# Patient Record
Sex: MALE
[De-identification: names, ages, dates, MRNs, and addresses within clinical notes are randomized per-mention and may not be internally consistent; named-entity substitution may affect disease eponyms.]

---

## 2017-11-16 ENCOUNTER — LABORATORY RESULT (OUTPATIENT)
Age: 52
End: 2017-11-16

## 2017-11-17 ENCOUNTER — TRANSCRIPTION ENCOUNTER (OUTPATIENT)
Age: 52
End: 2017-11-17

## 2017-11-17 ENCOUNTER — APPOINTMENT (OUTPATIENT)
Dept: INTERNAL MEDICINE | Facility: CLINIC | Age: 52
End: 2017-11-17
Payer: COMMERCIAL

## 2017-11-17 VITALS
WEIGHT: 203 LBS | BODY MASS INDEX: 28.42 KG/M2 | HEIGHT: 71 IN | TEMPERATURE: 98 F | SYSTOLIC BLOOD PRESSURE: 146 MMHG | HEART RATE: 89 BPM | OXYGEN SATURATION: 99 % | DIASTOLIC BLOOD PRESSURE: 88 MMHG

## 2017-11-17 DIAGNOSIS — Z81.8 FAMILY HISTORY OF OTHER MENTAL AND BEHAVIORAL DISORDERS: ICD-10-CM

## 2017-11-17 DIAGNOSIS — Z82.49 FAMILY HISTORY OF ISCHEMIC HEART DISEASE AND OTHER DISEASES OF THE CIRCULATORY SYSTEM: ICD-10-CM

## 2017-11-17 PROCEDURE — 99386 PREV VISIT NEW AGE 40-64: CPT | Mod: 25

## 2017-11-17 PROCEDURE — 93000 ELECTROCARDIOGRAM COMPLETE: CPT

## 2017-11-17 PROCEDURE — 90471 IMMUNIZATION ADMIN: CPT

## 2017-11-17 PROCEDURE — 90686 IIV4 VACC NO PRSV 0.5 ML IM: CPT

## 2017-11-17 PROCEDURE — 90715 TDAP VACCINE 7 YRS/> IM: CPT

## 2017-11-17 PROCEDURE — 36415 COLL VENOUS BLD VENIPUNCTURE: CPT

## 2017-11-17 PROCEDURE — G0008: CPT | Mod: LT

## 2017-11-17 PROCEDURE — 90472 IMMUNIZATION ADMIN EACH ADD: CPT

## 2017-11-20 ENCOUNTER — MESSAGE (OUTPATIENT)
Age: 52
End: 2017-11-20

## 2017-11-20 LAB
ALBUMIN SERPL ELPH-MCNC: 4.2 G/DL
ALP BLD-CCNC: 66 U/L
ALT SERPL-CCNC: 33 U/L
ANION GAP SERPL CALC-SCNC: 17 MMOL/L
APPEARANCE: CLEAR
AST SERPL-CCNC: 31 U/L
BASOPHILS # BLD AUTO: 0.03 K/UL
BASOPHILS NFR BLD AUTO: 0.3 %
BILIRUB SERPL-MCNC: 0.4 MG/DL
BILIRUBIN URINE: NEGATIVE
BLOOD URINE: NEGATIVE
BUN SERPL-MCNC: 24 MG/DL
CALCIUM SERPL-MCNC: 9.8 MG/DL
CHLORIDE SERPL-SCNC: 103 MMOL/L
CHOLEST SERPL-MCNC: 199 MG/DL
CHOLEST/HDLC SERPL: 4.1 RATIO
CO2 SERPL-SCNC: 21 MMOL/L
COLOR: YELLOW
CREAT SERPL-MCNC: 1.09 MG/DL
EOSINOPHIL # BLD AUTO: 0.09 K/UL
EOSINOPHIL NFR BLD AUTO: 0.9 %
GLUCOSE QUALITATIVE U: NEGATIVE MG/DL
GLUCOSE SERPL-MCNC: 73 MG/DL
HAV IGG+IGM SER QL: NONREACTIVE
HBA1C MFR BLD HPLC: 5.7 %
HBV SURFACE AB SER QL: NONREACTIVE
HBV SURFACE AG SER QL: NONREACTIVE
HCT VFR BLD CALC: 37.5 %
HCV AB SER QL: NONREACTIVE
HCV S/CO RATIO: 0.2 S/CO
HDLC SERPL-MCNC: 49 MG/DL
HGB BLD-MCNC: 11.5 G/DL
HIV1+2 AB SPEC QL IA.RAPID: NONREACTIVE
IMM GRANULOCYTES NFR BLD AUTO: 0.2 %
KETONES URINE: NEGATIVE
LDLC SERPL CALC-MCNC: 128 MG/DL
LEUKOCYTE ESTERASE URINE: NEGATIVE
LYMPHOCYTES # BLD AUTO: 1.43 K/UL
LYMPHOCYTES NFR BLD AUTO: 13.6 %
MAN DIFF?: NORMAL
MCHC RBC-ENTMCNC: 24.5 PG
MCHC RBC-ENTMCNC: 30.7 GM/DL
MCV RBC AUTO: 80 FL
MONOCYTES # BLD AUTO: 0.81 K/UL
MONOCYTES NFR BLD AUTO: 7.7 %
NEUTROPHILS # BLD AUTO: 8.15 K/UL
NEUTROPHILS NFR BLD AUTO: 77.3 %
NITRITE URINE: NEGATIVE
PH URINE: 7.5
PLATELET # BLD AUTO: 321 K/UL
POTASSIUM SERPL-SCNC: 5.1 MMOL/L
PROT SERPL-MCNC: 7.7 G/DL
PROTEIN URINE: NEGATIVE MG/DL
PSA SERPL-MCNC: 0.52 NG/ML
RBC # BLD: 4.69 M/UL
RBC # FLD: 19.7 %
SODIUM SERPL-SCNC: 141 MMOL/L
SPECIFIC GRAVITY URINE: 1.01
TRIGL SERPL-MCNC: 111 MG/DL
UROBILINOGEN URINE: NEGATIVE MG/DL
WBC # FLD AUTO: 10.53 K/UL

## 2020-01-10 ENCOUNTER — TRANSCRIPTION ENCOUNTER (OUTPATIENT)
Age: 55
End: 2020-01-10

## 2020-01-17 ENCOUNTER — APPOINTMENT (OUTPATIENT)
Dept: INTERNAL MEDICINE | Facility: CLINIC | Age: 55
End: 2020-01-17

## 2020-02-06 ENCOUNTER — NON-APPOINTMENT (OUTPATIENT)
Age: 55
End: 2020-02-06

## 2020-02-06 ENCOUNTER — LABORATORY RESULT (OUTPATIENT)
Age: 55
End: 2020-02-06

## 2020-02-06 ENCOUNTER — APPOINTMENT (OUTPATIENT)
Dept: INTERNAL MEDICINE | Facility: CLINIC | Age: 55
End: 2020-02-06
Payer: COMMERCIAL

## 2020-02-06 VITALS
DIASTOLIC BLOOD PRESSURE: 90 MMHG | HEART RATE: 83 BPM | SYSTOLIC BLOOD PRESSURE: 166 MMHG | BODY MASS INDEX: 29.57 KG/M2 | TEMPERATURE: 98 F | WEIGHT: 212 LBS | OXYGEN SATURATION: 100 %

## 2020-02-06 DIAGNOSIS — I10 ESSENTIAL (PRIMARY) HYPERTENSION: ICD-10-CM

## 2020-02-06 DIAGNOSIS — R07.89 OTHER CHEST PAIN: ICD-10-CM

## 2020-02-06 PROCEDURE — 99396 PREV VISIT EST AGE 40-64: CPT | Mod: 25

## 2020-02-06 PROCEDURE — 93000 ELECTROCARDIOGRAM COMPLETE: CPT

## 2020-02-06 PROCEDURE — 36415 COLL VENOUS BLD VENIPUNCTURE: CPT

## 2020-02-06 PROCEDURE — 99212 OFFICE O/P EST SF 10 MIN: CPT | Mod: 25

## 2020-02-07 LAB
C TRACH RRNA SPEC QL NAA+PROBE: NOT DETECTED
HIV1+2 AB SPEC QL IA.RAPID: NONREACTIVE
N GONORRHOEA RRNA SPEC QL NAA+PROBE: NOT DETECTED
SOURCE AMPLIFICATION: NORMAL
T PALLIDUM AB SER QL IA: NEGATIVE

## 2020-02-08 LAB
HSV 1+2 IGG SER IA-IMP: NEGATIVE
HSV 1+2 IGG SER IA-IMP: POSITIVE
HSV1 IGG SER QL: 1.17 INDEX
HSV2 IGG SER QL: 0.44 INDEX

## 2020-10-06 ENCOUNTER — APPOINTMENT (OUTPATIENT)
Dept: INTERNAL MEDICINE | Facility: CLINIC | Age: 55
End: 2020-10-06
Payer: COMMERCIAL

## 2020-10-06 VITALS
HEART RATE: 92 BPM | TEMPERATURE: 98 F | WEIGHT: 210 LBS | OXYGEN SATURATION: 99 % | DIASTOLIC BLOOD PRESSURE: 90 MMHG | BODY MASS INDEX: 29.29 KG/M2 | SYSTOLIC BLOOD PRESSURE: 162 MMHG

## 2020-10-06 DIAGNOSIS — Z23 ENCOUNTER FOR IMMUNIZATION: ICD-10-CM

## 2020-10-06 PROCEDURE — 99213 OFFICE O/P EST LOW 20 MIN: CPT

## 2020-10-06 PROCEDURE — 90686 IIV4 VACC NO PRSV 0.5 ML IM: CPT

## 2020-10-06 PROCEDURE — G0008: CPT

## 2020-10-23 ENCOUNTER — APPOINTMENT (OUTPATIENT)
Dept: INTERNAL MEDICINE | Facility: CLINIC | Age: 55
End: 2020-10-23
Payer: COMMERCIAL

## 2020-10-23 PROCEDURE — 99213 OFFICE O/P EST LOW 20 MIN: CPT | Mod: 25

## 2020-10-23 PROCEDURE — 99072 ADDL SUPL MATRL&STAF TM PHE: CPT

## 2020-11-13 ENCOUNTER — APPOINTMENT (OUTPATIENT)
Dept: INTERNAL MEDICINE | Facility: CLINIC | Age: 55
End: 2020-11-13
Payer: COMMERCIAL

## 2020-11-13 PROCEDURE — 99213 OFFICE O/P EST LOW 20 MIN: CPT | Mod: 95

## 2020-11-22 ENCOUNTER — RX RENEWAL (OUTPATIENT)
Age: 55
End: 2020-11-22

## 2021-02-14 ENCOUNTER — TRANSCRIPTION ENCOUNTER (OUTPATIENT)
Age: 56
End: 2021-02-14

## 2021-02-22 ENCOUNTER — NON-APPOINTMENT (OUTPATIENT)
Age: 56
End: 2021-02-22

## 2021-10-07 ENCOUNTER — RX RENEWAL (OUTPATIENT)
Age: 56
End: 2021-10-07

## 2021-12-17 ENCOUNTER — APPOINTMENT (OUTPATIENT)
Dept: INTERNAL MEDICINE | Facility: CLINIC | Age: 56
End: 2021-12-17

## 2022-01-18 ENCOUNTER — LABORATORY RESULT (OUTPATIENT)
Age: 57
End: 2022-01-18

## 2022-01-18 ENCOUNTER — APPOINTMENT (OUTPATIENT)
Dept: INTERNAL MEDICINE | Facility: CLINIC | Age: 57
End: 2022-01-18
Payer: COMMERCIAL

## 2022-01-18 VITALS
HEIGHT: 71 IN | HEART RATE: 72 BPM | DIASTOLIC BLOOD PRESSURE: 86 MMHG | SYSTOLIC BLOOD PRESSURE: 146 MMHG | BODY MASS INDEX: 29.26 KG/M2 | TEMPERATURE: 97.1 F | WEIGHT: 209 LBS | OXYGEN SATURATION: 98 %

## 2022-01-18 DIAGNOSIS — U07.1 COVID-19: ICD-10-CM

## 2022-01-18 PROCEDURE — 99213 OFFICE O/P EST LOW 20 MIN: CPT | Mod: 25

## 2022-01-18 PROCEDURE — 36415 COLL VENOUS BLD VENIPUNCTURE: CPT

## 2022-01-18 PROCEDURE — 99396 PREV VISIT EST AGE 40-64: CPT | Mod: 25

## 2022-01-19 LAB
ALBUMIN SERPL ELPH-MCNC: 4.4 G/DL
ALP BLD-CCNC: 75 U/L
ALT SERPL-CCNC: 37 U/L
ANION GAP SERPL CALC-SCNC: 14 MMOL/L
APPEARANCE: CLEAR
AST SERPL-CCNC: 30 U/L
BASOPHILS # BLD AUTO: 0.04 K/UL
BASOPHILS NFR BLD AUTO: 0.6 %
BILIRUB SERPL-MCNC: 0.3 MG/DL
BILIRUBIN URINE: NEGATIVE
BLOOD URINE: NEGATIVE
BUN SERPL-MCNC: 13 MG/DL
CALCIUM SERPL-MCNC: 8.9 MG/DL
CHLORIDE SERPL-SCNC: 103 MMOL/L
CHOLEST SERPL-MCNC: 194 MG/DL
CO2 SERPL-SCNC: 23 MMOL/L
COLOR: COLORLESS
CREAT SERPL-MCNC: 0.98 MG/DL
EOSINOPHIL # BLD AUTO: 0.13 K/UL
EOSINOPHIL NFR BLD AUTO: 2 %
ESTIMATED AVERAGE GLUCOSE: 120 MG/DL
GLUCOSE QUALITATIVE U: NEGATIVE
GLUCOSE SERPL-MCNC: 91 MG/DL
HBA1C MFR BLD HPLC: 5.8 %
HCT VFR BLD CALC: 38.8 %
HDLC SERPL-MCNC: 38 MG/DL
HGB BLD-MCNC: 11.3 G/DL
IMM GRANULOCYTES NFR BLD AUTO: 0.2 %
KETONES URINE: NEGATIVE
LDLC SERPL CALC-MCNC: 124 MG/DL
LEUKOCYTE ESTERASE URINE: NEGATIVE
LYMPHOCYTES # BLD AUTO: 2.42 K/UL
LYMPHOCYTES NFR BLD AUTO: 36.4 %
MAN DIFF?: NORMAL
MCHC RBC-ENTMCNC: 23.6 PG
MCHC RBC-ENTMCNC: 29.1 GM/DL
MCV RBC AUTO: 81.2 FL
MONOCYTES # BLD AUTO: 0.75 K/UL
MONOCYTES NFR BLD AUTO: 11.3 %
NEUTROPHILS # BLD AUTO: 3.3 K/UL
NEUTROPHILS NFR BLD AUTO: 49.5 %
NITRITE URINE: NEGATIVE
NONHDLC SERPL-MCNC: 156 MG/DL
PH URINE: 7
PLATELET # BLD AUTO: 312 K/UL
POTASSIUM SERPL-SCNC: 4 MMOL/L
PROT SERPL-MCNC: 7 G/DL
PROTEIN URINE: NEGATIVE
RBC # BLD: 4.78 M/UL
RBC # FLD: 20.2 %
SODIUM SERPL-SCNC: 141 MMOL/L
SPECIFIC GRAVITY URINE: 1
TRIGL SERPL-MCNC: 159 MG/DL
UROBILINOGEN URINE: NORMAL
WBC # FLD AUTO: 6.65 K/UL

## 2022-01-20 LAB
HIV1+2 AB SPEC QL IA.RAPID: NONREACTIVE
T PALLIDUM AB SER QL IA: NEGATIVE

## 2022-01-24 LAB
C TRACH RRNA SPEC QL NAA+PROBE: NOT DETECTED
N GONORRHOEA RRNA SPEC QL NAA+PROBE: NOT DETECTED
SOURCE AMPLIFICATION: NORMAL

## 2022-02-14 ENCOUNTER — RX RENEWAL (OUTPATIENT)
Age: 57
End: 2022-02-14

## 2022-04-18 ENCOUNTER — APPOINTMENT (OUTPATIENT)
Age: 57
End: 2022-04-18
Payer: COMMERCIAL

## 2022-04-18 DIAGNOSIS — K64.9 UNSPECIFIED HEMORRHOIDS: ICD-10-CM

## 2022-04-18 DIAGNOSIS — K64.8 OTHER HEMORRHOIDS: ICD-10-CM

## 2022-04-18 PROCEDURE — 99204 OFFICE O/P NEW MOD 45 MIN: CPT | Mod: 95

## 2022-04-18 RX ORDER — ZINC OXIDE AND COCOA BUTTER 270; 2052 MG/1; MG/1
76-10 SUPPOSITORY RECTAL
Qty: 90 | Refills: 0 | Status: ACTIVE | COMMUNITY
Start: 2022-04-18 | End: 1900-01-01

## 2022-04-20 NOTE — REASON FOR VISIT
[Home] : at home, [unfilled] , at the time of the visit. [Medical Office: ([facility], [place])___] : at the medical office located in  [Verbal consent obtained from patient] : the patient, [unfilled] [Initial Evaluation] : an initial evaluation [FreeTextEntry1] : CRC

## 2022-04-20 NOTE — ASSESSMENT
[FreeTextEntry1] : 56M w/ PMHx of HTN p/f colon cancer screening\par \par #CRC Screening - Avg risk \par No fam hx of CRC.  Occasional hemorrhoid with blood on TP\par -R/B/A of colonoscopy discussed, patient verbalized understanding and agreement to plan\par -Colonoscopy at [facility]\par -Miralax Bowel Prep\par -COVID Boosted\par -Chaperone on day of procedure\par \par #Hemorrhoid\par Occasional hemorrhoid on wiping.  No active issue.  Will trial Calmol-4 as needed\par -Avoid prolonged time on the toilet\par -Calmol 4 PRN\par \par RTC postprocedure as needed\par \par Javier Young MD\par GI Fellow\par

## 2022-04-20 NOTE — HISTORY OF PRESENT ILLNESS
[FreeTextEntry1] : 56M w/ PMHx of HTN p/f colon cancer screening\par \par Doing well with no GI complaints.\par Having 1 BM per day, denies melena or hematochezia.  Occasional hemorrhoid with bleeding on TP.  No active issue.\par Denies fever, chill, cp, sob, abd pain, nausea, vomiting, diarrhea, constipation, bloating, belching, wt loss or loss of appetite.  Able to walk up 2 flights of stairs without shortness of breath\par \par ALL: NKDA\par Med: Denies use of AC.  Occasional Ibuprofen\par Surg hx: Denies\par SocHx: Denies smoking or drug use. Occasional alcohol\par Fam hx: Denies fam hx of CRC, IBD, or large polyps\par \par Imaging: \par EGD: No prior\par Colonoscopy: 1/2005, normal per patient\par

## 2022-10-03 ENCOUNTER — RX RENEWAL (OUTPATIENT)
Age: 57
End: 2022-10-03

## 2023-02-07 NOTE — PHYSICAL EXAM
[Normal Outer Ear/Nose] : the outer ears and nose were normal in appearance [No Respiratory Distress] : no respiratory distress  [No Accessory Muscle Use] : no accessory muscle use [No Carotid Bruits] : no carotid bruits [No Edema] : there was no peripheral edema [Normal] : affect was normal and insight and judgment were intact

## 2023-02-09 ENCOUNTER — NON-APPOINTMENT (OUTPATIENT)
Age: 58
End: 2023-02-09

## 2023-02-09 ENCOUNTER — APPOINTMENT (OUTPATIENT)
Dept: INTERNAL MEDICINE | Facility: CLINIC | Age: 58
End: 2023-02-09
Payer: COMMERCIAL

## 2023-02-09 ENCOUNTER — LABORATORY RESULT (OUTPATIENT)
Age: 58
End: 2023-02-09

## 2023-02-09 VITALS
DIASTOLIC BLOOD PRESSURE: 87 MMHG | BODY MASS INDEX: 30.24 KG/M2 | TEMPERATURE: 97.1 F | OXYGEN SATURATION: 98 % | WEIGHT: 216 LBS | HEART RATE: 76 BPM | HEIGHT: 71 IN | SYSTOLIC BLOOD PRESSURE: 132 MMHG

## 2023-02-09 DIAGNOSIS — E66.9 OBESITY, UNSPECIFIED: ICD-10-CM

## 2023-02-09 DIAGNOSIS — Z13.220 ENCOUNTER FOR SCREENING FOR LIPOID DISORDERS: ICD-10-CM

## 2023-02-09 DIAGNOSIS — R73.09 OTHER ABNORMAL GLUCOSE: ICD-10-CM

## 2023-02-09 DIAGNOSIS — Z12.5 ENCOUNTER FOR SCREENING FOR MALIGNANT NEOPLASM OF PROSTATE: ICD-10-CM

## 2023-02-09 DIAGNOSIS — Z00.00 ENCOUNTER FOR GENERAL ADULT MEDICAL EXAMINATION W/OUT ABNORMAL FINDINGS: ICD-10-CM

## 2023-02-09 DIAGNOSIS — Z12.11 ENCOUNTER FOR SCREENING FOR MALIGNANT NEOPLASM OF COLON: ICD-10-CM

## 2023-02-09 PROCEDURE — 99396 PREV VISIT EST AGE 40-64: CPT | Mod: 25

## 2023-02-09 PROCEDURE — G0447 BEHAVIOR COUNSEL OBESITY 15M: CPT

## 2023-02-09 PROCEDURE — 36415 COLL VENOUS BLD VENIPUNCTURE: CPT

## 2023-02-09 PROCEDURE — 99212 OFFICE O/P EST SF 10 MIN: CPT | Mod: 25

## 2023-02-09 NOTE — ASSESSMENT
[FreeTextEntry1] : Health maintenance.\par His BMI is elevated now borderline obesity and at least a 20 pound weight loss is recommended. We had an extended conversation regarding morbidities associated with overweight status and dietary/lifestyle approaches to weight loss were reviewed.\par Patient reports that he is now less at risk for STD and has not used Descovy for several months.  He still does have occasional contact and we discussed option of following 2 – 1 – 1 schedule rather than daily schedule.\par Follow-up blood work for STD sent today\par Increased aerobic exercise recommended as above.\par Patient denies substance abuse.\par No symptoms of depression and fully competent with all ADLs.\par Patient had a initial screening for colonoscopy but never proceeded to schedule..  Email sent today to gastroenterology to reach out to schedule patient.\par Has already received flu vaccine this year.\par Has completed COVID-vaccine series with 1 monovalent booster.  Rationale for bivalent booster was explained and patient will consider.\par Also recommend shingles vaccine series.  Patient states he will arrange later..\par \par HTN.\par BP taken in office today x2.\par 132/87.  128/85.\par Compared to prior BP results before starting treatment which were typically in the 165/90 range.\par Based on this comparison, patient has had a good response to treatment and has achieved  ACC/AHA recommendations.\par Patient advised to maintain full compliance with losartan HCTZ 100/12.5 mg which is well-tolerated.\par Discussed lifestyle management in detail including daily aerobic exercise, weight loss, anxiety management, and reduce dietary salt intake.\par \par High risk sexual behavior\par Discussed safe sex behavior in detail including #1) minimizing number of sexual contacts to the degree possible.  #2) obtaining medical information from new sexual contacts to the degree possible.  #3) avoiding sexual activity completely if inebriated.  #4) constant use of barrier protection during sexual activity with condom changes every 15 minutes as necessary to avoid micro-perforations.\par STD testing sent today.  Will renew prescription for Descovy when HIV test returns negative.\par Patient advised to use properly either on a daily basis or following a 2 – 1 – 1 schedule, which ever is appropriate for his level of sexual activity.\par

## 2023-02-09 NOTE — HISTORY OF PRESENT ILLNESS
[FreeTextEntry1] : 56-year-old male on treatment for hypertension returns for yearly CPE.\par Since he was last seen approximately 14 months ago, he denies hospitalization, surgery, new medical diagnoses.\par Has not contracted COVID infection. [de-identified] : Patient wants to discuss ongoing management of HTN.  States that he has been consistent with losartan HCTZ, 100/12.5 mg as previously prescribed without adverse side effects.\par

## 2023-02-09 NOTE — COUNSELING
[Weight management counseling provided] : Weight management [Healthy eating counseling provided] : healthy eating [Activity counseling provided] : activity [Behavioral health counseling provided] : behavioral health  [None] : None [Potential consequences of obesity discussed] : Potential consequences of obesity discussed [Benefits of weight loss discussed] : Benefits of weight loss discussed [Structured Weight Management Program suggested:] : Structured weight management program suggested [Encouraged to maintain food diary] : Encouraged to maintain food diary [Encouraged to increase physical activity] : Encouraged to increase physical activity [Encouraged to use exercise tracking device] : Encouraged to use exercise tracking device [Target Wt Loss Goal ___] : Weight Loss Goals: Target weight loss goal [unfilled] lbs [Weigh Self Weekly] : weigh self weekly [Decrease Portions] : decrease portions [Keep Food Diary] : keep food diary [Good understanding] : Patient has a good understanding of disease, goals and obesity follow-up plan [FreeTextEntry4] : 15

## 2023-02-09 NOTE — HEALTH RISK ASSESSMENT
[Good] : ~his/her~  mood as  good [Yes] : Yes [2 - 3 times a week (3 pts)] : 2 - 3  times a week (3 points) [1 or 2 (0 pts)] : 1 or 2 (0 points) [Never (0 pts)] : Never (0 points) [No] : In the past 12 months have you used drugs other than those required for medical reasons? No [No falls in past year] : Patient reported no falls in the past year [0] : 2) Feeling down, depressed, or hopeless: Not at all (0) [PHQ-2 Negative - No further assessment needed] : PHQ-2 Negative - No further assessment needed [Patient reported colonoscopy was normal] : Patient reported colonoscopy was normal [HIV test declined] : HIV test declined [Hepatitis C test declined] : Hepatitis C test declined [Alone] : lives alone [Employed] : employed [Single] : single [Sexually Active] : sexually active [High Risk Behavior] : high risk behavior [Fully functional (bathing, dressing, toileting, transferring, walking, feeding)] : Fully functional (bathing, dressing, toileting, transferring, walking, feeding) [Fully functional (using the telephone, shopping, preparing meals, housekeeping, doing laundry, using] : Fully functional and needs no help or supervision to perform IADLs (using the telephone, shopping, preparing meals, housekeeping, doing laundry, using transportation, managing medications and managing finances) [Reports normal functional visual acuity (ie: able to read med bottle)] : Reports normal functional visual acuity [Seat Belt] :  uses seat belt [Sunscreen] : uses sunscreen [Patient/Caregiver not ready to engage] : , patient/caregiver not ready to engage [Never] : Never [Audit-CScore] : 3 [[id number]] : 0 [Reports changes in hearing] : Reports no changes in hearing [Reports changes in vision] : Reports no changes in vision [Reports changes in dental health] : Reports no changes in dental health [TB Exposure] : is not being exposed to tuberculosis [ColonoscopyDate] : 1/2005 [AdvancecareDate] : 02/2023

## 2023-02-13 LAB
ALBUMIN SERPL ELPH-MCNC: 4.5 G/DL
ALP BLD-CCNC: 79 U/L
ALT SERPL-CCNC: 58 U/L
ANION GAP SERPL CALC-SCNC: 14 MMOL/L
APPEARANCE: CLEAR
AST SERPL-CCNC: 47 U/L
BASOPHILS # BLD AUTO: 0.07 K/UL
BASOPHILS NFR BLD AUTO: 0.9 %
BILIRUB SERPL-MCNC: 0.5 MG/DL
BILIRUBIN URINE: NEGATIVE
BLOOD URINE: NEGATIVE
BUN SERPL-MCNC: 14 MG/DL
C TRACH RRNA SPEC QL NAA+PROBE: NOT DETECTED
CALCIUM SERPL-MCNC: 9.6 MG/DL
CHLORIDE SERPL-SCNC: 103 MMOL/L
CHOLEST SERPL-MCNC: 220 MG/DL
CO2 SERPL-SCNC: 24 MMOL/L
COLOR: NORMAL
CREAT SERPL-MCNC: 0.91 MG/DL
EGFR: 98 ML/MIN/1.73M2
EOSINOPHIL # BLD AUTO: 0.14 K/UL
EOSINOPHIL NFR BLD AUTO: 1.7 %
ESTIMATED AVERAGE GLUCOSE: 126 MG/DL
GLUCOSE QUALITATIVE U: NEGATIVE
GLUCOSE SERPL-MCNC: 106 MG/DL
HBA1C MFR BLD HPLC: 6 %
HCT VFR BLD CALC: 37.5 %
HDLC SERPL-MCNC: 43 MG/DL
HGB BLD-MCNC: 11.7 G/DL
HIV1+2 AB SPEC QL IA.RAPID: NONREACTIVE
KETONES URINE: NEGATIVE
LDLC SERPL CALC-MCNC: 144 MG/DL
LEUKOCYTE ESTERASE URINE: NEGATIVE
LYMPHOCYTES # BLD AUTO: 2.71 K/UL
LYMPHOCYTES NFR BLD AUTO: 33.1 %
MAN DIFF?: NORMAL
MCHC RBC-ENTMCNC: 24.9 PG
MCHC RBC-ENTMCNC: 31.2 GM/DL
MCV RBC AUTO: 80 FL
MONOCYTES # BLD AUTO: 0.64 K/UL
MONOCYTES NFR BLD AUTO: 7.8 %
N GONORRHOEA RRNA SPEC QL NAA+PROBE: NOT DETECTED
NEUTROPHILS # BLD AUTO: 4.62 K/UL
NEUTROPHILS NFR BLD AUTO: 56.5 %
NITRITE URINE: NEGATIVE
NONHDLC SERPL-MCNC: 177 MG/DL
PH URINE: 7.5
PLATELET # BLD AUTO: 318 K/UL
POTASSIUM SERPL-SCNC: 4.4 MMOL/L
PROT SERPL-MCNC: 7.6 G/DL
PROTEIN URINE: NEGATIVE
PSA SERPL-MCNC: 0.76 NG/ML
RBC # BLD: 4.69 M/UL
RBC # FLD: 21.3 %
SODIUM SERPL-SCNC: 140 MMOL/L
SOURCE AMPLIFICATION: NORMAL
SPECIFIC GRAVITY URINE: 1.01
T PALLIDUM AB SER QL IA: NEGATIVE
TRIGL SERPL-MCNC: 165 MG/DL
UROBILINOGEN URINE: NORMAL
WBC # FLD AUTO: 8.18 K/UL

## 2023-03-15 ENCOUNTER — APPOINTMENT (OUTPATIENT)
Dept: INTERNAL MEDICINE | Facility: CLINIC | Age: 58
End: 2023-03-15
Payer: COMMERCIAL

## 2023-03-15 PROCEDURE — 99214 OFFICE O/P EST MOD 30 MIN: CPT | Mod: 95

## 2023-03-15 NOTE — ASSESSMENT
[FreeTextEntry1] : 1.)  Elevated LFTs\par REVIEWED AST/ALT elevated at 47/58 on blood work from 2/13/2023.  All prior LFTs had been normal.\par Explained that this finding is most likely secondary to excessive alcohol use.  Early hepatic steatosis also possible diagnosis.\par Patient urged to reduce overall alcoholic intake by 50% if possible.\par He will return to office in 3 or 4 months for follow-up testing (without any alcohol use for at least 3 or 4 days prior).  We will also send blood work for hepatitis profile and consider obtaining hepatic ultrasound\par \par #2) Prediabetes\par REVIEWED hemoglobin A1c elevated at 6.0 from 2/13/2023 in comparison to prior results (5.7 in 2017 and 5.8 in 2022).  Upward trend is consistent with diagnosis of prediabetes.\par The rationale (to reduce vascular and other complications) as well as the goal (hemoglobin A1c under 5.7) for management of prediabetes was explained and patient questions answered.\par Discussed lifestyle management including aerobic exercise as tolerated and structured low–calorie diet.  Specific recommendations provided.\par Patient indicates understanding and agreement with recommendations.  States he is already altered his diet and will try to advance these changes in the future.\par Return to office in 4 months for follow-up testing.\par \par #3) Dyslipidemia\par REVIEWED lipid panel from 2/13/2023 which shows LDL elevated at 144 with relatively reduced HDL at 43.  In comparison, LDL from 2022 was 124\par The rationale (to reduce vascular and other complications) as well as the goal (LDL at least under 110, ideally under 90 given relatively reduced HDL) for lipid management was explained and patient questions answered.\par Lifestyle management discussed including structured diet with reduction in saturated fats (milk products, fried foods, tropical oils, etc.)\par Also discussed potential association between dyslipidemia and hepatic steatosis (if present).\par Patient indicates understanding and agreement and will try to follow recommendations.\par Follow-up fasting lipid panel in 4 months.\par \par #4) High risk sexual behavior\par Reviewed negative HIV testing.\par Prescription for Descovy was resubmitted to pharmacy with instructions for use as per prior discussion regarding 2 – 1 – 1 schedule.\par

## 2023-04-10 RX ORDER — EMTRICITABINE AND TENOFOVIR ALAFENAMIDE 200; 25 MG/1; MG/1
200-25 TABLET ORAL
Qty: 30 | Refills: 0 | Status: ACTIVE | COMMUNITY
Start: 2022-01-20 | End: 1900-01-01

## 2023-05-15 ENCOUNTER — RX RENEWAL (OUTPATIENT)
Age: 58
End: 2023-05-15

## 2023-05-23 ENCOUNTER — RX RENEWAL (OUTPATIENT)
Age: 58
End: 2023-05-23

## 2023-05-30 ENCOUNTER — RX RENEWAL (OUTPATIENT)
Age: 58
End: 2023-05-30

## 2023-06-02 ENCOUNTER — RX RENEWAL (OUTPATIENT)
Age: 58
End: 2023-06-02

## 2023-06-26 ENCOUNTER — RX RENEWAL (OUTPATIENT)
Age: 58
End: 2023-06-26

## 2023-06-29 ENCOUNTER — APPOINTMENT (OUTPATIENT)
Dept: INTERNAL MEDICINE | Facility: CLINIC | Age: 58
End: 2023-06-29
Payer: COMMERCIAL

## 2023-06-29 VITALS
SYSTOLIC BLOOD PRESSURE: 175 MMHG | BODY MASS INDEX: 28.77 KG/M2 | WEIGHT: 205.5 LBS | DIASTOLIC BLOOD PRESSURE: 82 MMHG | HEART RATE: 66 BPM | OXYGEN SATURATION: 99 % | HEIGHT: 71 IN | TEMPERATURE: 97.7 F

## 2023-06-29 DIAGNOSIS — R79.89 OTHER SPECIFIED ABNORMAL FINDINGS OF BLOOD CHEMISTRY: ICD-10-CM

## 2023-06-29 DIAGNOSIS — E78.5 HYPERLIPIDEMIA, UNSPECIFIED: ICD-10-CM

## 2023-06-29 DIAGNOSIS — R73.03 PREDIABETES.: ICD-10-CM

## 2023-06-29 DIAGNOSIS — I10 ESSENTIAL (PRIMARY) HYPERTENSION: ICD-10-CM

## 2023-06-29 DIAGNOSIS — Z72.51 HIGH RISK HETEROSEXUAL BEHAVIOR: ICD-10-CM

## 2023-06-29 PROCEDURE — 99214 OFFICE O/P EST MOD 30 MIN: CPT | Mod: 25

## 2023-06-29 PROCEDURE — 36415 COLL VENOUS BLD VENIPUNCTURE: CPT

## 2023-06-29 NOTE — HISTORY OF PRESENT ILLNESS
[FreeTextEntry1] : Elevated LFTs\par Prediabetes\par Dyslipidemia\par HTN [de-identified] : Patient wishes to discuss diagnosis and management of the above diagnoses.\par States that he is aware of excessive alcohol use and excessive calorie intake and has already begun to reduce both of these.  States has had a 6 pound weight loss.\par He also wishes to discuss long-term management of HTN.  He notes that he ran out of BP medications and requests refill.

## 2023-06-29 NOTE — ASSESSMENT
[FreeTextEntry1] : 1.)  Elevated LFTs\par Again REVIEWED AST/ALT elevated at 47/58 on blood work from 2/13/2023.  All prior LFTs had been normal.\par Explained that this finding is most likely secondary to excessive alcohol use.  Early hepatic steatosis also possible diagnosis.\par Patient states that in general he has reduced his alcohol use but that he happened to have several drinks last night for a special party.\par Follow-up liver panel probably not useful today based on this.  He will return in another 4 months and promises not to have any alcoholic beverages for several days prior.  Will reassess at that time.\par \par #2) Prediabetes\par Again REVIEWED hemoglobin A1c elevated at 6.0 from 2/13/2023 in comparison to prior results (5.7 in 2017 and 5.8 in 2022).  Upward trend is consistent with diagnosis of prediabetes.\par The rationale (to reduce vascular and other complications) as well as the goal (hemoglobin A1c under 5.7) for management of prediabetes was again explained\par Again discussed lifestyle management including aerobic exercise as tolerated and structured low–calorie diet.  Specific recommendations provided.\par Patient notes that he was able to lose 10 pounds in the interim since his last visit 4 months ago based on following these recommendations and he feels that his A1c may have decreased.\par Follow-up testing sent today.  Further discussion pending results.\par \par #3) Dyslipidemia\par Again REVIEWED lipid panel from 2/13/2023 which shows LDL elevated at 144 with relatively reduced HDL at 43.  In comparison, LDL from 2022 was 124\par The rationale (to reduce vascular and other complications) as well as the goal (LDL at least under 110, ideally under 90 given relatively reduced HDL) for lipid management was again explained. \par Lifestyle management discussed including structured diet with reduction in saturated fats (milk products, fried foods, tropical oils, etc.)\par Patient states he has been following these recommendations relatively well since last seen 4 months ago and suspects that his lipid panel may have improved.\par Follow-up lipid panel sent today.  Further discussion when results are available.\par \par #4) HTN\par BP taken x2 in office today.\par 160/85.  155/80.\par Significantly elevated since last visit when his BP was 132/87 but patient has not been on medication for a few weeks.\par Losartan HCTZ 100/12.5 mg prescription was renewed at his pharmacy.\par Importance of daily compliance with medication was emphasized.\par Patient will return in 4 months for reevaluation.\par \par Patient is aware of recommendation to start low-dose statin medication if LDL continues to be significantly elevated.\par \par \par #4) High risk sexual behavior\par Patient states that he has not been sexually active since his last visit and that follow-up STD testing is not necessary at this time.\par

## 2023-06-30 LAB
ALBUMIN SERPL ELPH-MCNC: 4.8 G/DL
ALP BLD-CCNC: 87 U/L
ALT SERPL-CCNC: 92 U/L
ANION GAP SERPL CALC-SCNC: 13 MMOL/L
AST SERPL-CCNC: 306 U/L
BILIRUB SERPL-MCNC: 0.4 MG/DL
BUN SERPL-MCNC: 18 MG/DL
CALCIUM SERPL-MCNC: 9.6 MG/DL
CHLORIDE SERPL-SCNC: 104 MMOL/L
CHOLEST SERPL-MCNC: 201 MG/DL
CO2 SERPL-SCNC: 23 MMOL/L
CREAT SERPL-MCNC: 0.91 MG/DL
EGFR: 98 ML/MIN/1.73M2
ESTIMATED AVERAGE GLUCOSE: 126 MG/DL
GLUCOSE SERPL-MCNC: 88 MG/DL
HBA1C MFR BLD HPLC: 6 %
HDLC SERPL-MCNC: 43 MG/DL
LDLC SERPL CALC-MCNC: 140 MG/DL
NONHDLC SERPL-MCNC: 158 MG/DL
POTASSIUM SERPL-SCNC: 4.4 MMOL/L
PROT SERPL-MCNC: 7.4 G/DL
SODIUM SERPL-SCNC: 141 MMOL/L
TRIGL SERPL-MCNC: 92 MG/DL

## 2023-07-19 ENCOUNTER — RX RENEWAL (OUTPATIENT)
Age: 58
End: 2023-07-19

## 2023-08-01 ENCOUNTER — TRANSCRIPTION ENCOUNTER (OUTPATIENT)
Age: 58
End: 2023-08-01

## 2023-08-01 DIAGNOSIS — F41.8 OTHER SPECIFIED ANXIETY DISORDERS: ICD-10-CM

## 2023-08-01 RX ORDER — PROPRANOLOL HYDROCHLORIDE 10 MG/1
10 TABLET ORAL
Qty: 30 | Refills: 1 | Status: ACTIVE | COMMUNITY
Start: 2023-08-01 | End: 1900-01-01

## 2023-09-21 ENCOUNTER — RX RENEWAL (OUTPATIENT)
Age: 58
End: 2023-09-21

## 2023-10-02 ENCOUNTER — RX RENEWAL (OUTPATIENT)
Age: 58
End: 2023-10-02

## 2023-10-16 ENCOUNTER — RX RENEWAL (OUTPATIENT)
Age: 58
End: 2023-10-16

## 2023-10-30 ENCOUNTER — RX RENEWAL (OUTPATIENT)
Age: 58
End: 2023-10-30

## 2023-11-14 ENCOUNTER — RX RENEWAL (OUTPATIENT)
Age: 58
End: 2023-11-14

## 2024-01-02 ENCOUNTER — RX RENEWAL (OUTPATIENT)
Age: 59
End: 2024-01-02

## 2024-01-22 ENCOUNTER — RX RENEWAL (OUTPATIENT)
Age: 59
End: 2024-01-22

## 2024-01-30 ENCOUNTER — RX RENEWAL (OUTPATIENT)
Age: 59
End: 2024-01-30

## 2024-02-05 ENCOUNTER — RX RENEWAL (OUTPATIENT)
Age: 59
End: 2024-02-05

## 2024-02-20 ENCOUNTER — RX RENEWAL (OUTPATIENT)
Age: 59
End: 2024-02-20

## 2024-02-26 ENCOUNTER — RX RENEWAL (OUTPATIENT)
Age: 59
End: 2024-02-26

## 2024-05-20 RX ORDER — LOSARTAN POTASSIUM AND HYDROCHLOROTHIAZIDE 12.5; 1 MG/1; MG/1
100-12.5 TABLET ORAL
Qty: 90 | Refills: 3 | Status: ACTIVE | COMMUNITY
Start: 2020-10-23 | End: 1900-01-01

## 2024-11-04 ENCOUNTER — NON-APPOINTMENT (OUTPATIENT)
Age: 59
End: 2024-11-04

## 2024-11-12 ENCOUNTER — NON-APPOINTMENT (OUTPATIENT)
Age: 59
End: 2024-11-12

## 2024-11-12 ENCOUNTER — APPOINTMENT (OUTPATIENT)
Dept: INTERNAL MEDICINE | Facility: CLINIC | Age: 59
End: 2024-11-12
Payer: COMMERCIAL

## 2024-11-12 ENCOUNTER — INPATIENT (INPATIENT)
Facility: HOSPITAL | Age: 59
LOS: 1 days | Discharge: HOME | End: 2024-11-14
Attending: STUDENT IN AN ORGANIZED HEALTH CARE EDUCATION/TRAINING PROGRAM | Admitting: STUDENT IN AN ORGANIZED HEALTH CARE EDUCATION/TRAINING PROGRAM
Payer: COMMERCIAL

## 2024-11-12 VITALS
TEMPERATURE: 97 F | RESPIRATION RATE: 15 BRPM | SYSTOLIC BLOOD PRESSURE: 131 MMHG | DIASTOLIC BLOOD PRESSURE: 87 MMHG | HEIGHT: 71 IN | HEART RATE: 104 BPM | OXYGEN SATURATION: 97 % | WEIGHT: 195.99 LBS

## 2024-11-12 VITALS
HEART RATE: 100 BPM | BODY MASS INDEX: 27.3 KG/M2 | SYSTOLIC BLOOD PRESSURE: 123 MMHG | OXYGEN SATURATION: 96 % | HEIGHT: 71 IN | DIASTOLIC BLOOD PRESSURE: 83 MMHG | WEIGHT: 195 LBS | TEMPERATURE: 97.7 F

## 2024-11-12 DIAGNOSIS — R10.9 ABDOMINAL DISTENSION (GASEOUS): ICD-10-CM

## 2024-11-12 DIAGNOSIS — R14.0 ABDOMINAL DISTENSION (GASEOUS): ICD-10-CM

## 2024-11-12 DIAGNOSIS — R19.7 DIARRHEA, UNSPECIFIED: ICD-10-CM

## 2024-11-12 LAB
ALBUMIN SERPL ELPH-MCNC: 2.8 G/DL — LOW (ref 3.4–5)
ALP SERPL-CCNC: 82 U/L — SIGNIFICANT CHANGE UP (ref 40–120)
ALT FLD-CCNC: 60 U/L — HIGH (ref 12–42)
ANION GAP SERPL CALC-SCNC: 8 MMOL/L — LOW (ref 9–16)
APPEARANCE UR: CLEAR — SIGNIFICANT CHANGE UP
APTT BLD: 34.6 SEC — SIGNIFICANT CHANGE UP (ref 24.5–35.6)
APTT BLD: 91.4 SEC — HIGH (ref 24.5–35.6)
AST SERPL-CCNC: 72 U/L — HIGH (ref 15–37)
BASOPHILS # BLD AUTO: 0.06 K/UL — SIGNIFICANT CHANGE UP (ref 0–0.2)
BASOPHILS # BLD AUTO: 0.09 K/UL — SIGNIFICANT CHANGE UP (ref 0–0.2)
BASOPHILS NFR BLD AUTO: 0.5 % — SIGNIFICANT CHANGE UP (ref 0–2)
BASOPHILS NFR BLD AUTO: 1 % — SIGNIFICANT CHANGE UP (ref 0–2)
BILIRUB SERPL-MCNC: 1.1 MG/DL — SIGNIFICANT CHANGE UP (ref 0.2–1.2)
BILIRUB UR-MCNC: NEGATIVE — SIGNIFICANT CHANGE UP
BUN SERPL-MCNC: 19 MG/DL — SIGNIFICANT CHANGE UP (ref 7–23)
CALCIUM SERPL-MCNC: 8.4 MG/DL — LOW (ref 8.5–10.5)
CHLORIDE SERPL-SCNC: 105 MMOL/L — SIGNIFICANT CHANGE UP (ref 96–108)
CO2 SERPL-SCNC: 23 MMOL/L — SIGNIFICANT CHANGE UP (ref 22–31)
COLOR SPEC: YELLOW — SIGNIFICANT CHANGE UP
CREAT SERPL-MCNC: 1.42 MG/DL — HIGH (ref 0.5–1.3)
DIFF PNL FLD: NEGATIVE — SIGNIFICANT CHANGE UP
EGFR: 57 ML/MIN/1.73M2 — LOW
EOSINOPHIL # BLD AUTO: 0.05 K/UL — SIGNIFICANT CHANGE UP (ref 0–0.5)
EOSINOPHIL # BLD AUTO: 0.09 K/UL — SIGNIFICANT CHANGE UP (ref 0–0.5)
EOSINOPHIL NFR BLD AUTO: 0.4 % — SIGNIFICANT CHANGE UP (ref 0–6)
EOSINOPHIL NFR BLD AUTO: 1 % — SIGNIFICANT CHANGE UP (ref 0–6)
GLUCOSE SERPL-MCNC: 107 MG/DL — HIGH (ref 70–99)
GLUCOSE UR QL: NEGATIVE MG/DL — SIGNIFICANT CHANGE UP
HCT VFR BLD CALC: 29.5 % — LOW (ref 39–50)
HCT VFR BLD CALC: 33.8 % — LOW (ref 39–50)
HCT VFR BLD CALC: 39.8 % — SIGNIFICANT CHANGE UP (ref 39–50)
HGB BLD-MCNC: 11.1 G/DL — LOW (ref 13–17)
HGB BLD-MCNC: 12.7 G/DL — LOW (ref 13–17)
HGB BLD-MCNC: 9.3 G/DL — LOW (ref 13–17)
IMM GRANULOCYTES NFR BLD AUTO: 0.4 % — SIGNIFICANT CHANGE UP (ref 0–0.9)
INR BLD: 1.24 — HIGH (ref 0.85–1.16)
KETONES UR-MCNC: NEGATIVE MG/DL — SIGNIFICANT CHANGE UP
LACTATE BLDV-MCNC: 1.3 MMOL/L — SIGNIFICANT CHANGE UP (ref 0.5–2)
LEUKOCYTE ESTERASE UR-ACNC: NEGATIVE — SIGNIFICANT CHANGE UP
LIDOCAIN IGE QN: 143 U/L — HIGH (ref 16–77)
LYMPHOCYTES # BLD AUTO: 4.67 K/UL — HIGH (ref 1–3.3)
LYMPHOCYTES # BLD AUTO: 51 % — HIGH (ref 13–44)
LYMPHOCYTES # BLD AUTO: 65.3 % — HIGH (ref 13–44)
LYMPHOCYTES # BLD AUTO: 8.09 K/UL — HIGH (ref 1–3.3)
MCHC RBC-ENTMCNC: 26.3 PG — LOW (ref 27–34)
MCHC RBC-ENTMCNC: 26.8 PG — LOW (ref 27–34)
MCHC RBC-ENTMCNC: 26.9 PG — LOW (ref 27–34)
MCHC RBC-ENTMCNC: 31.5 G/DL — LOW (ref 32–36)
MCHC RBC-ENTMCNC: 31.9 G/DL — LOW (ref 32–36)
MCHC RBC-ENTMCNC: 32.8 G/DL — SIGNIFICANT CHANGE UP (ref 32–36)
MCV RBC AUTO: 81.8 FL — SIGNIFICANT CHANGE UP (ref 80–100)
MCV RBC AUTO: 82.4 FL — SIGNIFICANT CHANGE UP (ref 80–100)
MCV RBC AUTO: 85 FL — SIGNIFICANT CHANGE UP (ref 80–100)
MONOCYTES # BLD AUTO: 0.18 K/UL — SIGNIFICANT CHANGE UP (ref 0–0.9)
MONOCYTES # BLD AUTO: 0.62 K/UL — SIGNIFICANT CHANGE UP (ref 0–0.9)
MONOCYTES NFR BLD AUTO: 2 % — SIGNIFICANT CHANGE UP (ref 2–14)
MONOCYTES NFR BLD AUTO: 5 % — SIGNIFICANT CHANGE UP (ref 2–14)
NEUTROPHILS # BLD AUTO: 3.21 K/UL — SIGNIFICANT CHANGE UP (ref 1.8–7.4)
NEUTROPHILS # BLD AUTO: 3.52 K/UL — SIGNIFICANT CHANGE UP (ref 1.8–7.4)
NEUTROPHILS NFR BLD AUTO: 28.4 % — LOW (ref 43–77)
NEUTROPHILS NFR BLD AUTO: 30 % — LOW (ref 43–77)
NITRITE UR-MCNC: NEGATIVE — SIGNIFICANT CHANGE UP
NRBC # BLD: 0 /100 WBCS — SIGNIFICANT CHANGE UP (ref 0–0)
NRBC # BLD: 0 /100 WBCS — SIGNIFICANT CHANGE UP (ref 0–0)
NRBC # BLD: SIGNIFICANT CHANGE UP /100 WBCS (ref 0–0)
OB PNL STL: POSITIVE
PH UR: 5.5 — SIGNIFICANT CHANGE UP (ref 5–8)
PLATELET # BLD AUTO: 151 K/UL — SIGNIFICANT CHANGE UP (ref 150–400)
PLATELET # BLD AUTO: 176 K/UL — SIGNIFICANT CHANGE UP (ref 150–400)
PLATELET # BLD AUTO: 204 K/UL — SIGNIFICANT CHANGE UP (ref 150–400)
POTASSIUM SERPL-MCNC: 4.4 MMOL/L — SIGNIFICANT CHANGE UP (ref 3.5–5.3)
POTASSIUM SERPL-SCNC: 4.4 MMOL/L — SIGNIFICANT CHANGE UP (ref 3.5–5.3)
PROT SERPL-MCNC: 7.7 G/DL — SIGNIFICANT CHANGE UP (ref 6.4–8.2)
PROT UR-MCNC: ABNORMAL MG/DL
PROTHROM AB SERPL-ACNC: 14.3 SEC — HIGH (ref 9.9–13.4)
RBC # BLD: 3.47 M/UL — LOW (ref 4.2–5.8)
RBC # BLD: 4.13 M/UL — LOW (ref 4.2–5.8)
RBC # BLD: 4.83 M/UL — SIGNIFICANT CHANGE UP (ref 4.2–5.8)
RBC # FLD: 20.3 % — HIGH (ref 10.3–14.5)
RBC # FLD: 20.5 % — HIGH (ref 10.3–14.5)
RBC # FLD: 20.9 % — HIGH (ref 10.3–14.5)
SODIUM SERPL-SCNC: 136 MMOL/L — SIGNIFICANT CHANGE UP (ref 132–145)
SP GR SPEC: 1.04 — HIGH (ref 1–1.03)
UROBILINOGEN FLD QL: 1 MG/DL — SIGNIFICANT CHANGE UP (ref 0.2–1)
WBC # BLD: 12.39 K/UL — HIGH (ref 3.8–10.5)
WBC # BLD: 7.27 K/UL — SIGNIFICANT CHANGE UP (ref 3.8–10.5)
WBC # BLD: 9.16 K/UL — SIGNIFICANT CHANGE UP (ref 3.8–10.5)
WBC # FLD AUTO: 12.39 K/UL — HIGH (ref 3.8–10.5)
WBC # FLD AUTO: 7.27 K/UL — SIGNIFICANT CHANGE UP (ref 3.8–10.5)
WBC # FLD AUTO: 9.16 K/UL — SIGNIFICANT CHANGE UP (ref 3.8–10.5)

## 2024-11-12 PROCEDURE — 74177 CT ABD & PELVIS W/CONTRAST: CPT | Mod: 26,MC

## 2024-11-12 PROCEDURE — 99285 EMERGENCY DEPT VISIT HI MDM: CPT

## 2024-11-12 PROCEDURE — 99213 OFFICE O/P EST LOW 20 MIN: CPT

## 2024-11-12 PROCEDURE — G2211 COMPLEX E/M VISIT ADD ON: CPT | Mod: NC

## 2024-11-12 RX ORDER — HEPARIN SODIUM 10000 [USP'U]/ML
3500 INJECTION INTRAVENOUS; SUBCUTANEOUS EVERY 6 HOURS
Refills: 0 | Status: DISCONTINUED | OUTPATIENT
Start: 2024-11-12 | End: 2024-11-13

## 2024-11-12 RX ORDER — KETOROLAC TROMETHAMINE 30 MG/ML
15 INJECTION INTRAMUSCULAR; INTRAVENOUS ONCE
Refills: 0 | Status: DISCONTINUED | OUTPATIENT
Start: 2024-11-12 | End: 2024-11-12

## 2024-11-12 RX ORDER — FAMOTIDINE 10 MG/ML
20 INJECTION INTRAVENOUS ONCE
Refills: 0 | Status: COMPLETED | OUTPATIENT
Start: 2024-11-12 | End: 2024-11-12

## 2024-11-12 RX ORDER — SODIUM CHLORIDE 9 MG/ML
1000 INJECTION, SOLUTION INTRAMUSCULAR; INTRAVENOUS; SUBCUTANEOUS ONCE
Refills: 0 | Status: COMPLETED | OUTPATIENT
Start: 2024-11-12 | End: 2024-11-12

## 2024-11-12 RX ORDER — HEPARIN SODIUM 10000 [USP'U]/ML
INJECTION INTRAVENOUS; SUBCUTANEOUS
Qty: 25000 | Refills: 0 | Status: DISCONTINUED | OUTPATIENT
Start: 2024-11-12 | End: 2024-11-13

## 2024-11-12 RX ORDER — HEPARIN SODIUM 10000 [USP'U]/ML
7000 INJECTION INTRAVENOUS; SUBCUTANEOUS EVERY 6 HOURS
Refills: 0 | Status: DISCONTINUED | OUTPATIENT
Start: 2024-11-12 | End: 2024-11-13

## 2024-11-12 RX ORDER — HEPARIN SODIUM 10000 [USP'U]/ML
7000 INJECTION INTRAVENOUS; SUBCUTANEOUS ONCE
Refills: 0 | Status: COMPLETED | OUTPATIENT
Start: 2024-11-12 | End: 2024-11-12

## 2024-11-12 RX ORDER — ONDANSETRON HYDROCHLORIDE 2 MG/ML
4 INJECTION, SOLUTION INTRAMUSCULAR; INTRAVENOUS ONCE
Refills: 0 | Status: COMPLETED | OUTPATIENT
Start: 2024-11-12 | End: 2024-11-12

## 2024-11-12 RX ADMIN — HEPARIN SODIUM 1600 UNIT(S)/HR: 10000 INJECTION INTRAVENOUS; SUBCUTANEOUS at 22:18

## 2024-11-12 RX ADMIN — SODIUM CHLORIDE 1000 MILLILITER(S): 9 INJECTION, SOLUTION INTRAMUSCULAR; INTRAVENOUS; SUBCUTANEOUS at 13:00

## 2024-11-12 RX ADMIN — ONDANSETRON HYDROCHLORIDE 4 MILLIGRAM(S): 2 INJECTION, SOLUTION INTRAMUSCULAR; INTRAVENOUS at 13:00

## 2024-11-12 RX ADMIN — FAMOTIDINE 20 MILLIGRAM(S): 10 INJECTION INTRAVENOUS at 13:00

## 2024-11-12 RX ADMIN — HEPARIN SODIUM 1600 UNIT(S)/HR: 10000 INJECTION INTRAVENOUS; SUBCUTANEOUS at 15:48

## 2024-11-12 RX ADMIN — HEPARIN SODIUM 7000 UNIT(S): 10000 INJECTION INTRAVENOUS; SUBCUTANEOUS at 15:49

## 2024-11-12 RX ADMIN — KETOROLAC TROMETHAMINE 15 MILLIGRAM(S): 30 INJECTION INTRAMUSCULAR; INTRAVENOUS at 13:00

## 2024-11-12 NOTE — ED PROVIDER NOTE - CLINICAL SUMMARY MEDICAL DECISION MAKING FREE TEXT BOX
+ SMV and portal vein thromboses with negative lactate, comfortable w/soft ntnd abdomen, hemodynamically stable -- d/w vascular attending on call, recommending heparin drip and medicine admit. Accepted by [facility] Medicine service to [facility].

## 2024-11-12 NOTE — ED ADULT NURSE REASSESSMENT NOTE - NS ED NURSE REASSESS COMMENT FT1
Pt received from DELBERT Townsend for break coverage. PT currently has heparin drip infusing on IV pump. Respirations even and unlabored. Pt has no complaints at this time. Plan of care ongoing.

## 2024-11-12 NOTE — ED ADULT NURSE NOTE - OBJECTIVE STATEMENT
history of hypertension, presents with 3 weeks of intermittent nausea/vomiting nonbilious nonbloody emesis, abdominal cramping and watery diarrhea.  No bloody stool.

## 2024-11-12 NOTE — ED PROVIDER NOTE - PROGRESS NOTE DETAILS
RN informed of patient's hemoglobin dropped.  Patient evaluated and has no obvious bleeding.  Fernando: Discussed performing a digital rectal exam for Hemoccult testing which patient is agreeable to.  Testing sent off, however patient noted to have substantial increase in heart rate upon standing.  Heart rate went from 80s–90s to 110s.  Blood pressure remained stable and patient is asymptomatic denying chest pain, shortness of breath, or dizziness. Fernando: Hemoccult positive.  Medicine intensivist called to discuss case.  We are repeating the hemoglobin and if the hemoglobin remains at its current level or below, patient will be accepted to the medical stepdown.  If hemoglobin is closer to original level, patient will remain assigned to regional bed. Fernando: Repeat hemoglobin 11.1 indicating 9.3 value was likely erroneous. Intensivist ok with patient going to floor.

## 2024-11-12 NOTE — ED PROVIDER NOTE - OBJECTIVE STATEMENT
59-year-old male with a history of hypertension, presents with 3 weeks of intermittent nausea/vomiting nonbilious nonbloody emesis, abdominal cramping and watery diarrhea.  No bloody stool.  No melena.  No fever or chills.  Denies urinary symptoms.  No shortness of breath/cough or chest pain.  Patient notes that he had a respiratory illness for which he was prescribed a short course of antibiotics, name of the antibiotic unknown, for approximately 5 days prior to the initiation of GI symptoms.  Denies travel or sick contacts.  No history of abdominal surgeries or similar symptoms in the past.  Seen at his primary care doctor's office today and referred to the emergency department for further workup.  No testing has been done so far. 59-year-old male with a history of hypertension, presents with 3 weeks of intermittent nausea/vomiting nonbilious nonbloody emesis, abdominal cramping and watery diarrhea.  No bloody stool.  No melena.  No fever or chills.  Denies urinary symptoms.  No shortness of breath/cough or chest pain.  Patient notes that he had a respiratory illness for which he was prescribed a short course of antibiotics, name of the antibiotic unknown, for approximately 5 days prior to the initiation of GI symptoms.  Denies travel or sick contacts.  No history of abdominal surgeries or similar symptoms in the past.  Seen at his primary care doctor's office today and referred to the emergency department for further workup.  No testing has been done so far.    Of note, mother  of an embolism and pt's sister has had some kind of blood clots in the past. No known coagulopathy.

## 2024-11-12 NOTE — ED PROVIDER NOTE - CARE PLAN
Principal Discharge DX:	Superior mesenteric vein thrombosis  Secondary Diagnosis:	Portal vein thrombosis   1

## 2024-11-13 DIAGNOSIS — I10 ESSENTIAL (PRIMARY) HYPERTENSION: ICD-10-CM

## 2024-11-13 DIAGNOSIS — R19.7 DIARRHEA, UNSPECIFIED: ICD-10-CM

## 2024-11-13 DIAGNOSIS — K55.069 ACUTE INFARCTION OF INTESTINE, PART AND EXTENT UNSPECIFIED: ICD-10-CM

## 2024-11-13 DIAGNOSIS — R11.2 NAUSEA WITH VOMITING, UNSPECIFIED: ICD-10-CM

## 2024-11-13 DIAGNOSIS — Z29.9 ENCOUNTER FOR PROPHYLACTIC MEASURES, UNSPECIFIED: ICD-10-CM

## 2024-11-13 DIAGNOSIS — I81 PORTAL VEIN THROMBOSIS: ICD-10-CM

## 2024-11-13 DIAGNOSIS — R09.89 OTHER SPECIFIED SYMPTOMS AND SIGNS INVOLVING THE CIRCULATORY AND RESPIRATORY SYSTEMS: ICD-10-CM

## 2024-11-13 LAB
ADD ON TEST-SPECIMEN IN LAB: SIGNIFICANT CHANGE UP
ADD ON TEST-SPECIMEN IN LAB: SIGNIFICANT CHANGE UP
ALBUMIN SERPL ELPH-MCNC: 3.1 G/DL — LOW (ref 3.3–5)
ALP SERPL-CCNC: 78 U/L — SIGNIFICANT CHANGE UP (ref 40–120)
ALT FLD-CCNC: 16 U/L — SIGNIFICANT CHANGE UP (ref 10–45)
ANION GAP SERPL CALC-SCNC: 9 MMOL/L — SIGNIFICANT CHANGE UP (ref 5–17)
APTT BLD: 115.2 SEC — HIGH (ref 24.5–35.6)
APTT BLD: 96.9 SEC — HIGH (ref 24.5–35.6)
AST SERPL-CCNC: 58 U/L — HIGH (ref 10–40)
BILIRUB SERPL-MCNC: 0.9 MG/DL — SIGNIFICANT CHANGE UP (ref 0.2–1.2)
BUN SERPL-MCNC: 14 MG/DL — SIGNIFICANT CHANGE UP (ref 7–23)
CALCIUM SERPL-MCNC: 7.9 MG/DL — LOW (ref 8.4–10.5)
CHLORIDE SERPL-SCNC: 105 MMOL/L — SIGNIFICANT CHANGE UP (ref 96–108)
CO2 SERPL-SCNC: 23 MMOL/L — SIGNIFICANT CHANGE UP (ref 22–31)
CREAT SERPL-MCNC: 1 MG/DL — SIGNIFICANT CHANGE UP (ref 0.5–1.3)
EGFR: 86 ML/MIN/1.73M2 — SIGNIFICANT CHANGE UP
GLUCOSE SERPL-MCNC: 98 MG/DL — SIGNIFICANT CHANGE UP (ref 70–99)
HAV IGM SER-ACNC: SIGNIFICANT CHANGE UP
HBV CORE IGM SER-ACNC: SIGNIFICANT CHANGE UP
HBV SURFACE AG SER-ACNC: SIGNIFICANT CHANGE UP
HCT VFR BLD CALC: 34.4 % — LOW (ref 39–50)
HCV AB S/CO SERPL IA: 0.06 S/CO — SIGNIFICANT CHANGE UP
HCV AB SERPL-IMP: SIGNIFICANT CHANGE UP
HGB BLD-MCNC: 11.1 G/DL — LOW (ref 13–17)
LACTATE SERPL-SCNC: 1.2 MMOL/L — SIGNIFICANT CHANGE UP (ref 0.5–2)
LDH SERPL L TO P-CCNC: 396 U/L — HIGH (ref 50–242)
MAGNESIUM SERPL-MCNC: 1.8 MG/DL — SIGNIFICANT CHANGE UP (ref 1.6–2.6)
MCHC RBC-ENTMCNC: 26.7 PG — LOW (ref 27–34)
MCHC RBC-ENTMCNC: 32.3 G/DL — SIGNIFICANT CHANGE UP (ref 32–36)
MCV RBC AUTO: 82.9 FL — SIGNIFICANT CHANGE UP (ref 80–100)
NRBC # BLD: 0 /100 WBCS — SIGNIFICANT CHANGE UP (ref 0–0)
PHOSPHATE SERPL-MCNC: 3.2 MG/DL — SIGNIFICANT CHANGE UP (ref 2.5–4.5)
PLATELET # BLD AUTO: 192 K/UL — SIGNIFICANT CHANGE UP (ref 150–400)
POTASSIUM SERPL-MCNC: 4 MMOL/L — SIGNIFICANT CHANGE UP (ref 3.5–5.3)
POTASSIUM SERPL-SCNC: 4 MMOL/L — SIGNIFICANT CHANGE UP (ref 3.5–5.3)
PROT SERPL-MCNC: 6.9 G/DL — SIGNIFICANT CHANGE UP (ref 6–8.3)
RBC # BLD: 4.15 M/UL — LOW (ref 4.2–5.8)
RBC # FLD: 20.7 % — HIGH (ref 10.3–14.5)
SODIUM SERPL-SCNC: 137 MMOL/L — SIGNIFICANT CHANGE UP (ref 135–145)
URATE SERPL-MCNC: 7.8 MG/DL — SIGNIFICANT CHANGE UP (ref 3.4–8.8)
WBC # BLD: 8.7 K/UL — SIGNIFICANT CHANGE UP (ref 3.8–10.5)
WBC # FLD AUTO: 8.7 K/UL — SIGNIFICANT CHANGE UP (ref 3.8–10.5)

## 2024-11-13 PROCEDURE — 93975 VASCULAR STUDY: CPT | Mod: 26

## 2024-11-13 PROCEDURE — 99223 1ST HOSP IP/OBS HIGH 75: CPT

## 2024-11-13 PROCEDURE — G0452: CPT | Mod: 26

## 2024-11-13 PROCEDURE — 93970 EXTREMITY STUDY: CPT | Mod: 26

## 2024-11-13 PROCEDURE — 99232 SBSQ HOSP IP/OBS MODERATE 35: CPT | Mod: GC

## 2024-11-13 RX ORDER — LORAZEPAM 2 MG
2 TABLET ORAL
Refills: 0 | Status: DISCONTINUED | OUTPATIENT
Start: 2024-11-13 | End: 2024-11-13

## 2024-11-13 RX ORDER — LOSARTAN POTASSIUM AND HYDROCHLOROTHIAZIDE 50; 12.5 MG/1; MG/1
0 TABLET, FILM COATED ORAL
Qty: 0 | Refills: 0 | DISCHARGE

## 2024-11-13 RX ORDER — HEPARIN SODIUM 10000 [USP'U]/ML
1500 INJECTION INTRAVENOUS; SUBCUTANEOUS
Qty: 25000 | Refills: 0 | Status: DISCONTINUED | OUTPATIENT
Start: 2024-11-13 | End: 2024-11-13

## 2024-11-13 RX ORDER — ENOXAPARIN SODIUM 40MG/0.4ML
90 SYRINGE (ML) SUBCUTANEOUS EVERY 12 HOURS
Refills: 0 | Status: DISCONTINUED | OUTPATIENT
Start: 2024-11-13 | End: 2024-11-13

## 2024-11-13 RX ORDER — INFLUENZ VIR VAC TV P-SURF2003 15MCG/.5ML
0.5 SYRINGE (ML) INTRAMUSCULAR ONCE
Refills: 0 | Status: DISCONTINUED | OUTPATIENT
Start: 2024-11-13 | End: 2024-11-14

## 2024-11-13 RX ORDER — SODIUM CHLORIDE 9 MG/ML
1000 INJECTION, SOLUTION INTRAMUSCULAR; INTRAVENOUS; SUBCUTANEOUS
Refills: 0 | Status: DISCONTINUED | OUTPATIENT
Start: 2024-11-13 | End: 2024-11-14

## 2024-11-13 RX ORDER — ONDANSETRON HYDROCHLORIDE 2 MG/ML
4 INJECTION, SOLUTION INTRAMUSCULAR; INTRAVENOUS EVERY 6 HOURS
Refills: 0 | Status: DISCONTINUED | OUTPATIENT
Start: 2024-11-13 | End: 2024-11-14

## 2024-11-13 RX ORDER — ENOXAPARIN SODIUM 40MG/0.4ML
89 SYRINGE (ML) SUBCUTANEOUS EVERY 12 HOURS
Refills: 0 | Status: DISCONTINUED | OUTPATIENT
Start: 2024-11-13 | End: 2024-11-13

## 2024-11-13 RX ORDER — ENOXAPARIN SODIUM 40MG/0.4ML
90 SYRINGE (ML) SUBCUTANEOUS EVERY 12 HOURS
Refills: 0 | Status: DISCONTINUED | OUTPATIENT
Start: 2024-11-13 | End: 2024-11-14

## 2024-11-13 RX ADMIN — SODIUM CHLORIDE 80 MILLILITER(S): 9 INJECTION, SOLUTION INTRAMUSCULAR; INTRAVENOUS; SUBCUTANEOUS at 03:39

## 2024-11-13 RX ADMIN — Medication 90 MILLIGRAM(S): at 17:07

## 2024-11-13 RX ADMIN — HEPARIN SODIUM 15 UNIT(S)/HR: 10000 INJECTION INTRAVENOUS; SUBCUTANEOUS at 09:54

## 2024-11-13 NOTE — H&P ADULT - PROBLEM SELECTOR PLAN 1
No signs of rigid/guardingabdomen on exam. CT w/o necrosis.  - c/w Heparin, q6 PTT  - NPO for now  - Maintinance fluids   - q4-q6 abdominal check  - IVF: NS @100cc/hour x 12 hours No signs of rigid/guarding abdomen on exam. CT w/o necrosis. Family history of clotting however patient w/o personal history of coagulopathy. Weight and appetite stable per patient.     - c/w Heparin, q6 PTT  - NPO for now  - Maintinance fluids   - q4-q6 abdominal check  - IVF: NS @100cc/hour x 12 hours  - If acutely worsening would recommend surgical consult.   - Heme onc in AM- protein C, Protein S, factor V leiden, Antiphospholipid, anticardiolipid, lupus AC. No signs of rigid/guarding abdomen on exam. CT w/o necrosis. Family history of clotting however patient w/o personal history of coagulopathy. Weight and appetite stable per patient.   - c/w Heparin, q6 PTT  - NPO for now  - Maintinance fluids   - q4-q6 abdominal check  - IVF: NS @100cc/hour x 12 hours  - Lactate in AM   - f/u vascular consult in AM.   - If acutely worsening would recommend surgical consult.   - consider Heme onc in AM- protein C, Protein S, factor V leiden, Antiphospholipid, anticardiolipid, lupus AC. No signs of rigid/guarding abdomen on exam. CT w/o necrosis. Family history of clotting however patient w/o personal history of coagulopathy. Weight and appetite stable per patient. Due to nausea, vomiting, would keep anticoagulated with bowel rest at this time; serial abominal checks and hypercoagulable workup should be maintained.   - c/w Heparin, q6 PTT  - NPO for now  - Maintenance fluids   - q4-q6 abdominal check  - IVF: NS @100cc/hour x 12 hours  - Lactate in AM   - f/u vascular consult in AM.   - If acutely worsening would recommend surgical consult.   - consider Heme onc in AM and sending hypercoagulabe workup including: protein C, Protein S, factor V leiden, Antiphospholipid, anticardiolipid, lupus AC.

## 2024-11-13 NOTE — CONSULT NOTE ADULT - ATTENDING COMMENTS
Mr. Jb Almanza is a 59M w/ HTN who was transferred for 2.5 weeks of nausea, emesis, diarrhea and some epigastric abdominal pain, found to have acute thrombus of SMV, left and right branches of the portal vein. He was started on heparin drip and vascular surgery was consulted. He does have a family history of blood clots. His pain is very mild at this time. Passing flatus. Abdomen soft, mildly/minimally tender, and mildly distended. No peritoneal signs. No leg swelling. Palpable pedal pulses. WBC normal. Lactate 1.2. Afebrile and HDS.     Today 11/14/24, he continues to feel fine and improving. Switched to lovenox      ASSESSMENT  - 2.5 weeks of nausea, emesis, diarrhea and some epigastric abdominal pain, found to have acute thrombus of SMV, left and right branches of the portal vein --> no clinical signs of acute mesenteric ischemia at this time      PLAN & RECOMMENDATIONS  - No acute vascular surgery intervention  - Therapeutic heparin drip and then transition to oral AC per heme  - Heme consult and hypercoagulable workup  - Serial abdominal exmas  - Vascular surgery will sign off. Please reconsult PRN    Thank you,    Tang Hreedia MD   of Vascular Surgery  [facility] at [address], [address] [place] [address]  Office: [phone]; Fax: [phone]  [email or web address]    Thank you,    Tang Heredia MD   of Vascular Surgery  [facility] at [address], [address] [place] [address]  Office: [phone]; Fax: [phone]  [email or web address]
Agree with excellent note.  60 y/o without significant PMHx outside of EToH use found to have acute thrombosis of SMA and portal vein. Although strong family history, doubt genetic ancestry as underlying cause given age. Acquired clotting disorder such as APLS, or occult malignancy (be it solid tumor or lymphoproliferative) are more likely. Recommend age appropriate cancer screening on outpatient basis, lymphoproliferative neoplasm studies (JAK2/BCR-ABL), peripheral smear review, and APLS labs. Have sent genetic studies as there is still a chance that underlying disorder plays a role. Will require indefinite A/C; DOAC appropriate when ready for discharge.  -Mark Jhaveri

## 2024-11-13 NOTE — H&P ADULT - HISTORY OF PRESENT ILLNESS
59M with PMH of htn presents with 3 weeks of n+v, nbnb emesis, diarrhea, and cramping. Per patient, he had a URI 5 days prior for which he was prescribed antibiotics. He denies hematochezia, melena, fevers, chills, dysuria, dyspnea. Today he was seen as his PCP's office and recommended to come to the hospital.     FH: mother  of an embolism and pt's sister has had some kind of blood clots in the past. No known coagulopathy.    ED Course:  T98 ->90, /83 RR16 Sat 97% on RA   Significant labs: Hb 9.3->11.1 on repeat. PTT 34 -> 91.4; INR 1.24, PT 14. Cr 1.42 AST/ALT/ALP: 60/57/143. Lactate 1.3  Imaging CTAP showing acute thrombosis of superior mesenteric vein, l. branch portal vein, anterior division of r. portal vein. Moderate splenomegaly.  Interventions: Toradol, Heparin   59M with PMH of htn presents with 3 weeks of n+v, nbnb emesis, diarrhea, and cramping. It occurred acutely and for the f irst time in his life; he reports vomiting approximately 3x a day, more recently down to 1x and turning from food to something more bilious/green in the AM. His diarrhea has also been ongoing for about 3 weeks, approximately 1-2x a day, and streaks of brb (although no more than usual; he has known hemorrhoids).  He denies hematochezia, melena, fevers, chills, dysuria, dyspnea. Today he was seen as his PCP's office and recommended to come to the hospital.     FH: mother  of an embolism and pt's sister has had some kind of blood clots in the pas- reportedly currently on Xarelto. No known coagulopathy.    ED Course:  T98 ->90, /83 RR16 Sat 97% on RA   Significant labs: Hb 9.3->11.1 on repeat. PTT 34 -> 91.4; INR 1.24, PT 14. Cr 1.42 AST/ALT/ALP: 60/57/143. Lactate 1.3  Imaging CTAP showing acute thrombosis of superior mesenteric vein, l. branch portal vein, anterior division of r. portal vein. Moderate splenomegaly.  Interventions: Toradol, Heparin   59M with PMH of htn presents with 3 weeks of n+v, nbnb emesis, diarrhea, and cramping. It occurred acutely and for the f irst time in his life; he reports vomiting approximately 3x a day, more recently down to 1x and turning from food to something more bilious/green in the AM. His diarrhea has also been ongoing for about 3 weeks, approximately 1-2x a day, and streaks of brb (although no more than usual; he has known hemorrhoids).  He denies hematochezia, melena, fevers, chills, dysuria, dyspnea. Today he was seen as his PCP's office and recommended to come to the hospital.     FH: mother  of an embolism and pt's sister has had some kind of blood clots in the pas- reportedly currently on Xarelto. No known coagulopathy.    ED Course:  T98 ->90, /83 RR16 Sat 97% on RA   Significant labs: Hb 9.3->11.1 on repeat. PTT 34 -> 91.4; INR 1.24, PT 14. Cr 1.42 AST/ALT/ALP: 60/57/143. Lactate 1.3  Imaging CTAP showing acute thrombosis of superior mesenteric vein, l. branch portal vein, anterior division of r. portal vein. Moderate splenomegaly. Nonenlarged. 1.0 cm hypodensity in segment eight of liver cannot   be further characterized..    Consults: Vascular  Interventions: Toradol, Heparin   59M with PMH of htn presents with 3 weeks of n+v, emesis, abdominal cramping. This occurred acutely starting with vomiting approximately 3x a day, more recently down to 1x and turning from recently ingested food to green colored. His diarrhea has also been ongoing for about 1 weeks, approximately 1-2x a day, and streaks of brb (although no more than usual; he has known hemorrhoids), improving through the week.  He denies hematochezia, melena, fevers, chills, dysuria, dyspnea. Due ot these symptoms, today he was seen as his PCP's office and recommended to come to the hospital.     He adds that his mother  of a pulmonary embolism and his sister has had some blood clots; he believes she is currently on Xarelto. No known coagulopathy.    ED Course:  T98 ->90, /83 RR16 Sat 97% on RA   Significant labs: Hb 9.3->11.1 on repeat. PTT 34 -> 91.4; INR 1.24, PT 14. Cr 1.42 AST/ALT/ALP: 60/57/143. Lactate 1.3  Imaging CTAP showing acute thrombosis of superior mesenteric vein, l. branch portal vein, anterior division of r. portal vein. Moderate splenomegaly. Nonenlarged. 1.0 cm hypodensity in segment eight of liver cannot   be further characterized..    Consults: Vascular  Interventions: Toradol, Heparin

## 2024-11-13 NOTE — H&P ADULT - PROBLEM SELECTOR PLAN 2
CTAP showing acute thrombosis of superior mesenteric vein, l. branch portal vein, anterior division of r. portal vein. Moderate splenomegaly. Family history of clotting however patient w/o personal history of coagulopathy.   - LE US  - RUQ US of portal vein  - Hepatology consult in AM  - consider Heme CTAP showing acute thrombosis of superior mesenteric vein, l. branch portal vein, anterior division of r. portal vein. Moderate splenomegaly.   - LE US  - RUQ US of portal vein  - Hepatology consult in AM CTAP showing acute thrombosis of superior mesenteric vein, l. branch portal vein, anterior division of r. portal vein. Moderate splenomegaly. Nonenlarged. 1.0 cm hypodensity in segment eight of liver cannot  be further characterized..    - LE US  - RUQ US of portal vein  - Hepatology consult in AM CTAP showing acute thrombosis of superior mesenteric vein, l. branch portal vein, anterior division of r. portal vein. Moderate splenomegaly. Nonenlarged. 1.0 cm hypodensity in segment eight of liver cannot  be further characterized..    - RUQ US of portal vein for better characterization.   - Hepatology consult in AM

## 2024-11-13 NOTE — CONSULT NOTE ADULT - NS ATTEST RISK PROBLEM GEN_ALL_CORE FT
Multiple studies including genetic to rule out underlying malignancy or lymphoproliferative neoplasm, acute thrombosis of a major vascular structure (SMA + portal vein). Rule out of malignancy.

## 2024-11-13 NOTE — H&P ADULT - NSHPSOCIALHISTORY_GEN_ALL_CORE
Denies smoking  Not a drinker  Works as a . Denies smoking ever in life.  Denies drinking at this time.  Denies drug use.   Works as a , stable, safe at home. Denies smoking ever in life.  Daily drinker- 1x a day; however last drink was 3 weeks ago.   Denies drug use   Works as a , stable, safe at home.

## 2024-11-13 NOTE — H&P ADULT - PROBLEM SELECTOR PLAN 6
F: None   E: Replete as necessary K>4 Mg>2  N: NPO diet   DVT Prophylaxis: Heparin  GI prophylaxis: None   CODE STATUS: FULL  DISPO: [facility]

## 2024-11-13 NOTE — H&P ADULT - ASSESSMENT
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

## 2024-11-13 NOTE — H&P ADULT - NSHPPHYSICALEXAM_GEN_ALL_CORE
Constitutional: NAD, comfortable in bed.  HEENT: NC/AT, PERRLA, EOMI, no conjunctival pallor or scleral icterus, MMM  Neck: Supple, no JVD  Respiratory: CTA B/L. No w/r/r.   Cardiovascular: RRR, normal S1 and S2, no m/r/g.   Gastrointestinal: +BS, soft NTND, no guarding or rebound tenderness, no palpable masses. Some splenomegaly. Hepatic edge appropriate location.    Extremities: wwp; no cyanosis, clubbing or edema. Symmetircal and equal.   Vascular: Pulses equal and strong throughout.   Neurological: AAOx3, no CN deficits, strength and sensation intact throughout.   Skin: No gross skin abnormalities or rashes Constitutional: NAD, comfortable in bed.  HEENT: NC/AT, PERRLA, EOMI, no conjunctival pallor or scleral icterus, MMM  Neck: Supple, no JVD  Respiratory: CTA B/L. No w/r/r.   Cardiovascular: RRR, normal S1 and S2, no m/r/g.   Gastrointestinal: +BS, soft NTND, no guarding or rebound tenderness, no palpable masses. Some splenomegaly. Hepatic edge appropriate location.    Extremities: wwp; no cyanosis, clubbing or edema. Symmetrical and equal.   Vascular: Pulses equal and strong throughout.   Neurological: AAOx3, no CN deficits, strength and sensation intact throughout.   Skin: No gross skin abnormalities or rashes

## 2024-11-13 NOTE — H&P ADULT - PROBLEM SELECTOR PLAN 4
1-2 episodes a day for last week. Initial wbc of 12 however on repeat of 6. Ddx low for infectious cause, more likely 1-2 episodes a day for last week. Initial wbc of 12 however on repeat of 6. Ddx low for infectious cause, more likely 2/2 the ischemia and thrombosis.  - monitor. 1-2 episodes a day for last week. Initial wbc of 12 however on repeat of 6. Ddx low for infectious cause, more likely 2/2 the ischemia and thrombosis. Improving per patient.   - monitor sx for now.

## 2024-11-13 NOTE — H&P ADULT - NSHPLABSRESULTS_GEN_ALL_CORE
LABS:                         11.1   9.16  )-----------( 176      ( 2024 23:04 )             33.8         136  |  105  |  19  ----------------------------<  107[H]  4.4   |  23  |  1.42[H]    Ca    8.4[L]      2024 12:42    TPro  7.7  /  Alb  2.8[L]  /  TBili  1.1  /  DBili  x   /  AST  72[H]  /  ALT  60[H]  /  AlkPhos  82  12    PT/INR - ( 2024 14:22 )   PT: 14.3 sec;   INR: 1.24          PTT - ( 2024 21:20 )  PTT:91.4 sec  Urinalysis Basic - ( 2024 12:42 )    Color: Yellow / Appearance: Clear / S.037 / pH: x  Gluc: 107 mg/dL / Ketone: Negative mg/dL  / Bili: Negative / Urobili: 1.0 mg/dL   Blood: x / Protein: Trace mg/dL / Nitrite: Negative   Leuk Esterase: Negative / RBC: 0 /HPF / WBC 0 /HPF   Sq Epi: x / Non Sq Epi: x / Bacteria: Negative /HPF                RADIOLOGY, EKG & ADDITIONAL TESTS:

## 2024-11-13 NOTE — CONSULT NOTE ADULT - ASSESSMENT
59YM w/ PMHx of HTN, EtOH use dx admitted to [facility] w/ abdominal pain and nausea, found to have acute thrombosis of SMA and portal veins    Hematology consulted for hypercoagulable workup     Patient's history significant for EtOH use dx (drinks ~3 drinks/day)  No tobacco use  No steroid/testosterone use   No inciting precipitating factors (long travel, immobility, recent surgeries)     Family hx significant for a "clot" in sister for which she continues to take anticoagulation  Mother  from an unprovoked pulmonary embolus     # SMA, portal vein thrombosis   Patients family history and lack of overt precipitating factors concerning for hypercoagulable dx or occult malignancy     -- please obtain anticardiolipin, beta 2 glycoprotein, factor V leiden, prothrombin gene mutation  -- please note, there is limited efficacy in obtaining protein C, S, antithrombin III hexagonal phase or DRVVT as those tests are affected by acute thrombosis and might be falsely abnormal  -- fu US duplex  -- fu abdominal sono    # HCM  -- outpatient colonoscopy     Please obtain hepatitis studies and HIV   US abdomen complete for evaluation of 1cm lesion     To be seen and d/w with hematology oncology attending, Dr Rahul Jhaveri  59YM w/ PMHx of HTN, EtOH use dx admitted to [facility] w/ abdominal pain and nausea, found to have acute thrombosis of SMA and portal veins    Hematology consulted for hypercoagulable workup     Patient's history significant for EtOH use dx (drinks ~3 drinks/day)  No tobacco use  No steroid/testosterone use   No inciting precipitating factors (long travel, immobility, recent surgeries)     Family hx significant for a "clot" in sister for which she continues to take anticoagulation  Mother  from an unprovoked pulmonary embolus     # SMA, portal vein thrombosis   Patients family history and lack of overt precipitating factors concerning for hypercoagulable dx or occult malignancy     -- please obtain anticardiolipin, beta 2 glycoprotein, factor V leiden, prothrombin gene mutation  -- please note, there is limited efficacy in obtaining protein C, S, antithrombin III hexagonal phase or DRVVT as those tests are affected by acute thrombosis and might be falsely abnormal  -- fu US duplex  -- fu abdominal sono    #Splenomegaly     # HCM  -- outpatient colonoscopy     Please obtain hepatitis studies and HIV   US abdomen complete for evaluation of 1cm lesion     To be seen and d/w with hematology oncology attending, Dr Rahul Jhaveri  59YM w/ PMHx of HTN, EtOH use dx admitted to [facility] w/ abdominal pain and nausea, found to have acute thrombosis of SMA and portal veins    Hematology consulted for hypercoagulable workup     Patient's history significant for EtOH use dx (drinks ~3 drinks/day)  No tobacco use  No steroid/testosterone use   No inciting precipitating factors (long travel, immobility, recent surgeries)     Family hx significant for a "clot" in sister for which she continues to take anticoagulation  Mother  from an unprovoked pulmonary embolus     # SMA, portal vein thrombosis   Patients family history and lack of overt precipitating factors concerning for hypercoagulable dx or occult malignancy   -- lovenox followed by Eliquis upon dc   -- please obtain anticardiolipin, beta 2 glycoprotein, factor V leiden, prothrombin gene mutation  -- please note, there is limited efficacy in obtaining protein C, S, antithrombin III hexagonal phase or DRVVT as those tests are affected by acute thrombosis and might be falsely abnormal  -- fu US duplex  -- fu abdominal sono    #Splenomegaly   -- please send JAK2, BCR-ABL, ldh, uric acid, flow cytometry     # HCM  -- outpatient colonoscopy     Please obtain hepatitis studies and HIV   US abdomen complete for evaluation of 1cm lesion     To be seen and d/w with hematology oncology attending, Dr Rahul Jhaveri

## 2024-11-13 NOTE — H&P ADULT - ATTENDING COMMENTS
Pt seen and examined on teaching rounds  59M w HTN, EtOH use (3 drinks daily but recently cut down)p/w N/V, diarrhea, abd cramping, and dry cough (s/p azithromycin outpatient), for 3 weeks, in ED found to have Portal vein thrombosis and mesenteric vein thrombosis on CT A/P, elevated Lipase, started on heparin gtt, vascular sx consulted.    Pt reports having nausea, NBNB emesis over the last 3 weeks accompanied by diarrhea which has since resolved. He usually has 3 drinks daily and last drink when sxs began. Has not had recent injury/trauma/surgery. Denies smoking, prior VTE, long flights.   FH: Mother passed away from PE. Sister has clotting issue and is on xarelto lifelong.     #Portal vein thrombosis  #Mesenteric vein thrombosis    #EtOH use - pt reports last drink when symptoms began. Overall does not appear in acute withdrawal.   #Leukocytosis - resolved.   #MARSHALL - Cr 1.42 on admission - now 1.0. Unclear baseline. Possibly pre-renal/volume down on presentation w recent N/V and diarrhea. UA wo pyuria, bacteruria    Plan  Overall, pt symptoms appear to be controlled at this time. No diarrhea since admission and has not had nausea or vomiting. Noted nml pancreas appearance on CT A/P but w sxs N/V and abd cramping and elevated lipase - recent pancreatitis is on differential - EtOH vs passed stone. DDx for hypercoaguable state also potential cause as pt has significant FHx.  Bowel rest at this time  Heparin gtt for anticoagulation  f/u Abd doppler, BLE Doppler  Ambulate as tolerated  F/U Vascular c/s -- abd exam benign - would discuss if able to advance diet and timing to transition to therapeutic lovenox  F/U Heme-onc recs. Sending off hypercoaguable work-up    DISPO: TBD  Above d/w housestaff

## 2024-11-13 NOTE — H&P ADULT - PROBLEM SELECTOR PLAN 5
F: None   E: Replete as necessary K>4 Mg>2  N: NPO diet   DVT Prophylaxis: Heparin  GI prophylaxis: None   CODE STATUS: FULL  DISPO: [facility] Reported home med: Losartan-hctz. Currently normotensive.   - Restart after confirming in AM. Home medication:. Losartan Potassium-HCTZ 100-12.5 MG qd.  - Restart home medications.

## 2024-11-13 NOTE — PATIENT PROFILE ADULT - DIETITIAN.
Please enter labs for the patient and the clinical team can inform him when he can come in to get them done. Thank you.    dietitian/nutrition services

## 2024-11-13 NOTE — PATIENT PROFILE ADULT - FALL HARM RISK - HARM RISK INTERVENTIONS
Assistance with ambulation/Communicate Risk of Fall with Harm to all staff/Discuss with provider need for PT consult/Monitor for mental status changes/Monitor gait and stability/Reinforce activity limits and safety measures with patient and family/Reorient to person, place and time as needed/Tailored Fall Risk Interventions/Use of alarms - bed, chair and/or voice tab/Visual Cue: Yellow wristband and red socks/Bed in lowest position, wheels locked, appropriate side rails in place/Call bell, personal items and telephone in reach/Instruct patient to call for assistance before getting out of bed or chair/Non-slip footwear when patient is out of bed/[place] to call system/Physically safe environment - no spills, clutter or unnecessary equipment/Purposeful Proactive Rounding/Room/bathroom lighting operational, light cord in reach

## 2024-11-13 NOTE — CONSULT NOTE ADULT - ASSESSMENT
60yo Male pt with PMH of HTN and no PSHx presenting to [facility] ED from outpatient PCP office for 2.5wks hx of nausea, nbnb emesis, and diarrhea. Patient states he initially felt unwell 2.5wks agowith subjective fever and believed symptoms to be related to undiagnosed viral illness however continuation of nausea, emesis, diarrhea, and abdominal bloating brought him to his PCP who recommend ED presentation. In the ED, patient's labs notable for leukocytosis (12), Cr 1.42 (unclear baseline) with negative UA, fecal occult positive, lactate 1.5, and H/H 12.7/39.8. CT A/P with IV contrast was obtained with findings of acute thrombus of SMV, thrombus of left branch of portal vein and thrombus of anterior division of right portal vein. Patient was started on hep gtt and transferred to [facility] for further workup. Heme onc was consulted for further hypercoagulability workup. BLE venous duplex was obtained with no DVT noted and US abdomen was obtained with findings of right anterior portal vein thrombosis and partial thrombosis of left portal vein. [facility]culare surgery was consulted for portal vein thrombosis.    Recommendations:  -No acute vascular surgery intervention at this time  -Continue serial abdominal exams  -Continue hep gtt with goal PTT 60-90  -Follow up heme recs and hypercoagulability workup  Vascular surgery will sign off at this time    Plan pending attending final approval

## 2024-11-13 NOTE — PATIENT PROFILE ADULT - NSPROPTRIGHTCAREGIVER_GEN_A_NUR
Physical Therapy Evaluation    Visit Type: Initial Evaluation  Visit: 1  Referring Provider: Fahad Moscoso MD  Medical Diagnosis (from order): R32 - Urinary incontinence   Treatment Diagnosis: pelvic health - increased pain/symptoms, impaired strength, impaired bladder health, impaired bowel health, impaired motor function/performance/coordination and impaired muscle length/flexibility.  Onset  - Date of onset: 12/10/2012    SUBJECTIVE                                                                                                               The patient reports that she has urinary incontinence, and has urinary urgency. The patient states that it began 12/10/2012 and was been worsening with each pregnancy. The patient states that she also has large volumes of urinary incontinence at times. The patient states that her gynecologist tried to test her pelvic floor strength, but she was told that she was not able to perform a pelvic floor contraction.    Fluid habits:  Caffeine consumption: Coffee: 0 oz/day; Tea: occasionally; Soda: 0 oz/day; Juice: 0 oz/day  Water intake: 70 oz/day    Obstetric/gynecologic History:   Number of vaginal deliveries: 3  Number of  deliveries: 0  Number of miscarriages/abortions: 0  Episiotomies: 3  Complications in deliveries: no  Hysterectomy: no  Hormone replacement therapy: no    Pain / Symptoms  - Pain rating (out of 10): Current: 0 ; Best: 0; Worst: 0  - Quality / Description:         Bladder: frequency, leakage on way to restroom, leakage with activities, sense of incomplete emptying, difficulty emptying bladder and difficulty delaying urgency        Bowel: constipation, difficulty controlling gas and straining  - Alleviating Factors: avoids aggravating activity  - Progression since onset: worsening    Function:   Limitations / Exacerbation Factors:   - Patient reports difficulty with function reported below.  - , coughing/laughing/sneezing, need to wear a pad,  disruption in ADLs/IADLs due to restroom use, limited ability to be away from restroom, wearing clothing and restriction in fluid intake  - Running and jumping  Prior Level of Function: onset following child birth,    Patient Goals: decrease leakage.    Prior treatment  - no therapies  - Discharged from hospital, home health, or skilled nursing facility in last 30 days: no  Home Environment   - Patient lives with: significant other and children  - Denies 2 or more falls or an unexplained fall with injury in the last year.  - Feel safe at home / work / school: yes      OBJECTIVE                                                                                                                    Hand Dominance: right-handed               Pelvic Health  Bladder Bowel Information  Bladder:  Ability to delay the urge to urinate: 10 minutes  Voiding Intervals: 30 x day  Voiding Mechanics: squats in public restrooms  Night time toiletin x night  Urinary Incontinence:  Urinary Incontinence episodes: 4 x week  Activities causing incontinence: laughing, coughing, sneezing, jumping, running, lifting, and urgency  Triggers for urgency: walking to bathroom, pulling down pants, key in door, and running water  Pads: thin  Changes pad per day: working out or leaving the house  Do you ever leak urine without knowing it? no  Do you ever have difficulty initiating urination? no  Do you ever have dribbling after completing urination? no  Do you ever feel like the bladder does not empty completely? sometimes  Do you experience urgency? yes    Bowel:  Frequency of bowel movements: 4 times per day  Constipation: sometimes  - straining: yes  - splinting: no  - digital extraction: no  - laxative: no  - fiber supplement: no  Pain with bowel movements: no  Fecal incontinence: no  - activities causing incontinence: n/a  - frequency of incontinence: n/a  - urgency: n/a  - protection used : n/a  - medications: n/a  Flatulence incontinence:  sometimes  External Exam  - Ischiocavernosus:        Left: no tenderness        Right: no tenderness  - Superficial transverse perineal:         Left: no tenderness        Right: no tenderness  - Bulbocavernosus:         Left: no tenderness        Right: no tenderness  Internal Vaginal Exam  - Ischiocavernosus:         Left: no tenderness and no tightness        Right: no tenderness and no tightness  - Superficial transverse perineal:         Left: no tenderness and no tightness        Right: no tenderness and no tightness  -  Bulbocavernosus:         Left: no tenderness and no tightness        Right: no tenderness and no tightness  - Pubococcygeus:         Left: no tenderness and tightness        Right: no tenderness and tightness  - Iliococcygeus:         Left: no tenderness and tightness        Right: no tenderness and tightness  - Obturator internus:         Left: no tenderness and tightness        Right: no tenderness and tightness  Pelvic Floor  - Strength: 2-weak squeeze, no lift        endurance (seconds): 1        reps prior to fatigue: NT        fast reps prior to fatigue: NT  - Elevation absent.  - Elongation and relaxation: impaired motor control and/or kinesthesia  - Contract response: accessory    Outcome/Assessments  Outcome Measures at [facility] (02/01/2024):  Pelvic floor disability index (PFDI)= 156.25/300 (increased scores indicate increased disability with activities of daily living).  Prolapse: 33.33/100  Rectal: 43.75/100  Bladder: 79.17/100  Raw score: 41/80      Treatment     Therapeutic Exercise  Pelvic floor muscle exercise instruction and brief performance to assure competence  Supine Pelvic Floor Contraction 5 sec hold x 10 x 2  Pelvic Floor Contractions in Hooklying with Adduction 5 sec hold x 10 x 2    Education:  Anatomy and Physiology of the pelvic floor muscles  Skilled input: verbal instruction/cues, tactile instruction/cues, posture correction and facilitation    Writer verbally  educated and received verbal consent for hand placement, positioning of patient, and techniques to be performed today from patient for clothing adjustments for techniques, therapist position for techniques and hand placement and palpation for techniques as described above and how they are pertinent to the patient's plan of care.  Verbal consent received today for internal, external and external visualization pelvic floor muscle assessment and treatment.   Patient provided continued consent during evaluation and treatment.  Patient was given alternative options.  Benefits and drawbacks were explained.  Third person was offered to be in room during session, patient declined.  Home Exercise Program  Access Code: [email or web address]  URL: [email or web address]  Date: 02/01/2024  Prepared by: Rocío James    Exercises  - Supine Pelvic Floor Contraction  - 1 x daily - 7 x weekly - 3 sets - 10 reps - 5 sec hold  - Pelvic Floor Contractions in Hooklying with Adduction  - 1 x daily - 7 x weekly - 2 sets - 10 reps - 5 sec hold      ASSESSMENT                                                                                                          36 year old patient has reported functional limitations listed above impacted by signs and symptoms consistent with treatment diagnosis below.  Treatment Diagnosis:   - Involved: pelvic health.  - Symptoms/impairments: increased pain/symptoms, impaired strength, impaired bladder health, impaired bowel health, impaired motor function/performance/coordination and impaired muscle length/flexibility.    The patient is a 36 year old female who reports to physical therapy with mixed urinary incontinence and urinary urgency. The patient reports 4 episodes of urinary leakage per week, and not being able to delay the urge to urinate more than 10 minutes. The patient reports voiding 30 times a day, which is above the averages of 5-8 times a day. The patient also has a history of  three vaginal deliveries with episiotomies. Objective findings include decreased pelvic floor contraction strength, decreased pelvic floor contraction endurance, and impaired pelvic floor contraction coordination. The patient would benefit from skilled physical therapy to improve her pelvic floor contraction strength, pelvic floor contraction endurance, and pelvic floor contraction coordination to decrease her mixed urinary incontinence and urinary urgency.  Pain/symptoms after session (out of 10): 0    Prognosis: Patient will benefit from skilled therapy.  Rehabilitative potential is: fair due to. Positive factors: motivation level and healthy lifestyle. Negative factors: time since onset of symptoms and increasing symptoms since onset.  Predicted patient presentation: Low (stable) - Patient comorbidities and complexities, as defined above, will have little effect on progress for prescribed plan of care.  Education:   - Present and ready to learn: patient  - Results of above outlined education: Verbalizes understanding and Demonstrates understanding    PLAN                                                                                                                         The following skilled interventions to be implemented to achieve goals listed below:  Activities of Daily Living/Self Care (12577)  Gait Training (71281)  Neuromuscular Re-Education (37681)  Therapeutic Exercise (10125)  Electrical Stimulation Unattended (39339 or )  Heat/Cold (41231)  Biofeedback (01069/57888)  Manual Therapy (75529)  Therapeutic Activity (65932)  Electrical Stimulation Attended (64493)  -  Ultrasound (58584)    Frequency / Duration  1 times per week tapering as patient progresses for 8 weeks for an estimated total of 6 visits    Patient involved in and agreed to plan of care and goals.  Patient given attendance policy at time of initial evaluation.    Suggestions for next session as indicated: Progress per plan of care.  STIM  Transversus abdominus activation with pelvic floor contractions, bridge with a posterior pelvic tilt and pelvic floor contractions, concentric elevator pelvic floor exercise, standing pelvic floor contraction with feet in external rotation, and wall squat with pelvic floor contraction.  Also consider posterior pelvic tilt, and transversus abdominis activation.  Happy baby stretch, butterfly stretch, hamstring stretch, piriformis stretch, and diaphragmatic breathing.  Education on bladder irritants, knack techniques, urgency techniques, bladder emptying techniques, body mechanics and proper voiding techniques to decrease strain on pelvic floor (squats and constipation), and breathing mechanics during physical activity to decrease stress on pelvic floor.      Goals  Long Term Goals: to be met by end of plan of care  1. Decrease urinary leakage to one time a week or less to decrease the amount of sanitary pad usage.  2. Patient will improve voiding frequency to 10 times a day or less in order to participate in community and social activities with limited restroom access.    3. Patient will complete pelvic floor disability index (PFDI) form to reflect an improved score of 143.25/300 or less to indicate patient reported improvement in function/disability/impairment. (MCID IS [id number])   4. Patient will be independent with progressed and modified home exercise program.  5. Patient will demonstrate pelvic floor muscle strength of 3/8/8 or greater to improve her ability to remain continence with a cough or sneeze.      Therapy procedure time and total treatment time can be found documented on the Time Entry flowsheet     no

## 2024-11-13 NOTE — H&P ADULT - PROBLEM SELECTOR PLAN 3
Patient with n+v over several weeks. Not affected by meals, normally 1x in the AM as of late. Non bloody, looked bilious per patient. Possibly in the setting of mesenteric vein thrombosis. Currently comfortable, last vomiting yesterday.  - Zofran 4mg IV q6 hours prn.  - AC as above. Patient with n+v over several weeks. Not affected by meals, normally 1x in the AM as of late. Non bloody, looked bilious per patient. Possibly in the setting of mesenteric vein thrombosis. Currently comfortable, last vomiting yesterday.    - Zofran 4mg IV q6 hours prn.  - AC as above.

## 2024-11-14 ENCOUNTER — TRANSCRIPTION ENCOUNTER (OUTPATIENT)
Age: 59
End: 2024-11-14

## 2024-11-14 VITALS — WEIGHT: 195.99 LBS

## 2024-11-14 PROBLEM — I10 ESSENTIAL (PRIMARY) HYPERTENSION: Chronic | Status: ACTIVE | Noted: 2024-11-13

## 2024-11-14 LAB
ALBUMIN SERPL ELPH-MCNC: 3 G/DL — LOW (ref 3.3–5)
ALP SERPL-CCNC: 83 U/L — SIGNIFICANT CHANGE UP (ref 40–120)
ALT FLD-CCNC: 17 U/L — SIGNIFICANT CHANGE UP (ref 10–45)
ANION GAP SERPL CALC-SCNC: 11 MMOL/L — SIGNIFICANT CHANGE UP (ref 5–17)
APTT BLD: 40.2 SEC — HIGH (ref 24.5–35.6)
AST SERPL-CCNC: 63 U/L — HIGH (ref 10–40)
AT III ACT/NOR PPP CHRO: 47 % — LOW (ref 85–135)
B2 GLYCOPROT1 IGA SER QL: 10.3 U/ML — SIGNIFICANT CHANGE UP
B2 GLYCOPROT1 IGA SER QL: 11 U/ML — SIGNIFICANT CHANGE UP
B2 GLYCOPROT1 IGG SER-ACNC: 6.8 U/ML — SIGNIFICANT CHANGE UP
B2 GLYCOPROT1 IGG SER-ACNC: 7.2 U/ML — SIGNIFICANT CHANGE UP
B2 GLYCOPROT1 IGM SER-ACNC: 41.4 U/ML — HIGH
B2 GLYCOPROT1 IGM SER-ACNC: 47.8 U/ML — HIGH
BASOPHILS # BLD AUTO: 0.15 K/UL — SIGNIFICANT CHANGE UP (ref 0–0.2)
BASOPHILS NFR BLD AUTO: 1.9 % — SIGNIFICANT CHANGE UP (ref 0–2)
BILIRUB SERPL-MCNC: 0.9 MG/DL — SIGNIFICANT CHANGE UP (ref 0.2–1.2)
BUN SERPL-MCNC: 10 MG/DL — SIGNIFICANT CHANGE UP (ref 7–23)
CALCIUM SERPL-MCNC: 7.6 MG/DL — LOW (ref 8.4–10.5)
CARDIOLIPIN IGM SER-MCNC: 33.6 MPL U/ML — HIGH
CARDIOLIPIN IGM SER-MCNC: 8.5 GPL U/ML — SIGNIFICANT CHANGE UP
CHLORIDE SERPL-SCNC: 105 MMOL/L — SIGNIFICANT CHANGE UP (ref 96–108)
CO2 SERPL-SCNC: 22 MMOL/L — SIGNIFICANT CHANGE UP (ref 22–31)
CREAT SERPL-MCNC: 0.93 MG/DL — SIGNIFICANT CHANGE UP (ref 0.5–1.3)
DEPRECATED CARDIOLIPIN IGA SER: 10.7 APL U/ML — SIGNIFICANT CHANGE UP
EGFR: 94 ML/MIN/1.73M2 — SIGNIFICANT CHANGE UP
EOSINOPHIL # BLD AUTO: 0.07 K/UL — SIGNIFICANT CHANGE UP (ref 0–0.5)
EOSINOPHIL NFR BLD AUTO: 0.9 % — SIGNIFICANT CHANGE UP (ref 0–6)
GLUCOSE SERPL-MCNC: 94 MG/DL — SIGNIFICANT CHANGE UP (ref 70–99)
HCT VFR BLD CALC: 35 % — LOW (ref 39–50)
HGB BLD-MCNC: 11 G/DL — LOW (ref 13–17)
INR BLD: 1.15 — SIGNIFICANT CHANGE UP (ref 0.85–1.16)
LYMPHOCYTES # BLD AUTO: 2.83 K/UL — SIGNIFICANT CHANGE UP (ref 1–3.3)
LYMPHOCYTES # BLD AUTO: 35.5 % — SIGNIFICANT CHANGE UP (ref 13–44)
MAGNESIUM SERPL-MCNC: 1.7 MG/DL — SIGNIFICANT CHANGE UP (ref 1.6–2.6)
MCHC RBC-ENTMCNC: 25.9 PG — LOW (ref 27–34)
MCHC RBC-ENTMCNC: 31.4 G/DL — LOW (ref 32–36)
MCV RBC AUTO: 82.5 FL — SIGNIFICANT CHANGE UP (ref 80–100)
MELD SCORE WITH DIALYSIS: 21 POINTS — SIGNIFICANT CHANGE UP
MELD SCORE WITHOUT DIALYSIS: 8 POINTS — SIGNIFICANT CHANGE UP
MONOCYTES # BLD AUTO: 0.37 K/UL — SIGNIFICANT CHANGE UP (ref 0–0.9)
MONOCYTES NFR BLD AUTO: 4.7 % — SIGNIFICANT CHANGE UP (ref 2–14)
NEUTROPHILS # BLD AUTO: 4.39 K/UL — SIGNIFICANT CHANGE UP (ref 1.8–7.4)
NEUTROPHILS NFR BLD AUTO: 54.2 % — SIGNIFICANT CHANGE UP (ref 43–77)
NRBC # BLD: SIGNIFICANT CHANGE UP /100 WBCS (ref 0–0)
PHOSPHATE SERPL-MCNC: 2.5 MG/DL — SIGNIFICANT CHANGE UP (ref 2.5–4.5)
PLATELET # BLD AUTO: 233 K/UL — SIGNIFICANT CHANGE UP (ref 150–400)
POTASSIUM SERPL-MCNC: 4 MMOL/L — SIGNIFICANT CHANGE UP (ref 3.5–5.3)
POTASSIUM SERPL-SCNC: 4 MMOL/L — SIGNIFICANT CHANGE UP (ref 3.5–5.3)
PROT SERPL-MCNC: 6.7 G/DL — SIGNIFICANT CHANGE UP (ref 6–8.3)
PROTHROM AB SERPL-ACNC: 13.2 SEC — SIGNIFICANT CHANGE UP (ref 9.9–13.4)
RBC # BLD: 4.24 M/UL — SIGNIFICANT CHANGE UP (ref 4.2–5.8)
RBC # FLD: 21.1 % — HIGH (ref 10.3–14.5)
SODIUM SERPL-SCNC: 138 MMOL/L — SIGNIFICANT CHANGE UP (ref 135–145)
WBC # BLD: 7.97 K/UL — SIGNIFICANT CHANGE UP (ref 3.8–10.5)
WBC # FLD AUTO: 7.97 K/UL — SIGNIFICANT CHANGE UP (ref 3.8–10.5)

## 2024-11-14 PROCEDURE — 85300 ANTITHROMBIN III ACTIVITY: CPT

## 2024-11-14 PROCEDURE — 96375 TX/PRO/DX INJ NEW DRUG ADDON: CPT

## 2024-11-14 PROCEDURE — 85027 COMPLETE CBC AUTOMATED: CPT

## 2024-11-14 PROCEDURE — 83615 LACTATE (LD) (LDH) ENZYME: CPT

## 2024-11-14 PROCEDURE — 81240 F2 GENE: CPT

## 2024-11-14 PROCEDURE — 80053 COMPREHEN METABOLIC PANEL: CPT

## 2024-11-14 PROCEDURE — 96374 THER/PROPH/DIAG INJ IV PUSH: CPT

## 2024-11-14 PROCEDURE — 93970 EXTREMITY STUDY: CPT

## 2024-11-14 PROCEDURE — 81207 BCR/ABL1 GENE MINOR BP: CPT

## 2024-11-14 PROCEDURE — 86147 CARDIOLIPIN ANTIBODY EA IG: CPT

## 2024-11-14 PROCEDURE — 36415 COLL VENOUS BLD VENIPUNCTURE: CPT

## 2024-11-14 PROCEDURE — 86704 HEP B CORE ANTIBODY TOTAL: CPT

## 2024-11-14 PROCEDURE — 85613 RUSSELL VIPER VENOM DILUTED: CPT

## 2024-11-14 PROCEDURE — 85025 COMPLETE CBC W/AUTO DIFF WBC: CPT

## 2024-11-14 PROCEDURE — 81241 F5 GENE: CPT

## 2024-11-14 PROCEDURE — 84100 ASSAY OF PHOSPHORUS: CPT

## 2024-11-14 PROCEDURE — 83735 ASSAY OF MAGNESIUM: CPT

## 2024-11-14 PROCEDURE — 97161 PT EVAL LOW COMPLEX 20 MIN: CPT

## 2024-11-14 PROCEDURE — 99222 1ST HOSP IP/OBS MODERATE 55: CPT

## 2024-11-14 PROCEDURE — 85610 PROTHROMBIN TIME: CPT

## 2024-11-14 PROCEDURE — 74177 CT ABD & PELVIS W/CONTRAST: CPT | Mod: MC

## 2024-11-14 PROCEDURE — 84550 ASSAY OF BLOOD/URIC ACID: CPT

## 2024-11-14 PROCEDURE — 93975 VASCULAR STUDY: CPT

## 2024-11-14 PROCEDURE — 86146 BETA-2 GLYCOPROTEIN ANTIBODY: CPT

## 2024-11-14 PROCEDURE — 83690 ASSAY OF LIPASE: CPT

## 2024-11-14 PROCEDURE — 99239 HOSP IP/OBS DSCHRG MGMT >30: CPT | Mod: GC

## 2024-11-14 PROCEDURE — 81206 BCR/ABL1 GENE MAJOR BP: CPT

## 2024-11-14 PROCEDURE — 81001 URINALYSIS AUTO W/SCOPE: CPT

## 2024-11-14 PROCEDURE — 82272 OCCULT BLD FECES 1-3 TESTS: CPT

## 2024-11-14 PROCEDURE — 85301 ANTITHROMBIN III ANTIGEN: CPT

## 2024-11-14 PROCEDURE — 99285 EMERGENCY DEPT VISIT HI MDM: CPT

## 2024-11-14 PROCEDURE — 80074 ACUTE HEPATITIS PANEL: CPT

## 2024-11-14 PROCEDURE — 83605 ASSAY OF LACTIC ACID: CPT

## 2024-11-14 PROCEDURE — 85730 THROMBOPLASTIN TIME PARTIAL: CPT

## 2024-11-14 PROCEDURE — 85598 HEXAGNAL PHOSPH PLTLT NEUTRL: CPT

## 2024-11-14 RX ORDER — SIMETHICONE 80 MG/1
80 TABLET, CHEWABLE ORAL EVERY 6 HOURS
Refills: 0 | Status: DISCONTINUED | OUTPATIENT
Start: 2024-11-14 | End: 2024-11-14

## 2024-11-14 RX ORDER — APIXABAN 5 MG/1
1 TABLET, FILM COATED ORAL
Qty: 60 | Refills: 0
Start: 2024-11-14 | End: 2024-12-13

## 2024-11-14 RX ORDER — APIXABAN 5 MG/1
1 TABLET, FILM COATED ORAL
Qty: 30 | Refills: 0
Start: 2024-11-14 | End: 2024-12-13

## 2024-11-14 RX ORDER — APIXABAN 5 MG/1
10 TABLET, FILM COATED ORAL EVERY 12 HOURS
Refills: 0 | Status: DISCONTINUED | OUTPATIENT
Start: 2024-11-14 | End: 2024-11-14

## 2024-11-14 RX ORDER — SENNA 187 MG
2 TABLET ORAL EVERY 24 HOURS
Refills: 0 | Status: DISCONTINUED | OUTPATIENT
Start: 2024-11-14 | End: 2024-11-14

## 2024-11-14 RX ORDER — POLYETHYLENE GLYCOL 3350 17 G/17G
17 POWDER, FOR SOLUTION ORAL EVERY 12 HOURS
Refills: 0 | Status: DISCONTINUED | OUTPATIENT
Start: 2024-11-14 | End: 2024-11-14

## 2024-11-14 RX ORDER — SENNA 187 MG
2 TABLET ORAL AT BEDTIME
Refills: 0 | Status: DISCONTINUED | OUTPATIENT
Start: 2024-11-14 | End: 2024-11-14

## 2024-11-14 RX ADMIN — Medication 2 TABLET(S): at 12:16

## 2024-11-14 RX ADMIN — Medication 90 MILLIGRAM(S): at 06:13

## 2024-11-14 NOTE — PROGRESS NOTE ADULT - ATTENDING COMMENTS
59M w HTN, EtOH use (3 drinks daily but recently cut down)p/w N/V, diarrhea, abd cramping, and dry cough (s/p azithromycin outpatient), for 3 weeks, in ED found to have Portal vein thrombosis and mesenteric vein thrombosis on CT A/P, elevated Lipase, started on heparin gtt, vascular sx consulted, transitioned to therapeutic lovenox.     Pt reports he tolerated breakfast (full liquid) w less pain than dinner last night. Does report some distension/bloating but it is improved v yesterday. Denies nausea. +Flatus wo BM.   Exam: abd non-distended, NABS, non-tender.     #Portal vein thrombosis   - Abd doppler showing thrombus in portal vein. Occlusion in R anterior branch  #Mesenteric vein thrombosis    #EtOH use - pt reports last drink when symptoms began. Overall does not appear in acute withdrawal.   #Leukocytosis - resolved.   #MARSHALL - Cr 1.42 on admission - now 0.9. Unclear baseline. Possibly pre-renal/volume down on presentation w recent N/V and diarrhea. UA wo pyuria, bacteruria    Plan  Advance diet for lunch - monitor pain. Likely dc this afternoon if no symptom changes - transition to PO eliquis 10mg BID x7d then 5mg BID thereafter. Follow-up with Heme-Onc for pending hypercoagulability labs. PCP f/u w Dr. Beaver.    Above d/w housesta

## 2024-11-14 NOTE — DISCHARGE NOTE PROVIDER - NSDCMRMEDTOKEN_GEN_ALL_CORE_FT
LOSARTAN-HCTZ 100-12.5 MG TAB:    Eliquis Starter Pack for Treatment of DVT and PE 5 mg oral tablet: 1 tab(s) orally 2 times a day  LOSARTAN-HCTZ 100-12.5 MG TAB:

## 2024-11-14 NOTE — DISCHARGE NOTE NURSING/CASE MANAGEMENT/SOCIAL WORK - NSDCPEFALRISK_GEN_ALL_CORE
For information on Fall & Injury Prevention, visit: [email or web address]  [email or web address] OR  https://www.cdc.gov/steadi/patient.html

## 2024-11-14 NOTE — DIETITIAN INITIAL EVALUATION ADULT - OTHER CALCULATIONS
Estimated needs based on dosing wt as within % IBW 172lb/78.2kg (114%). Needs adjusted for age and clinical status.

## 2024-11-14 NOTE — PHYSICAL THERAPY INITIAL EVALUATION ADULT - PREDICTED DURATION OF THERAPY (DAYS/WKS), PT EVAL
[de-identified] : Recheck weight Pt. remains fullu functionally independent and does not require further in-patient PT f/u.

## 2024-11-14 NOTE — PROGRESS NOTE ADULT - PROBLEM SELECTOR PLAN 3
Patient with n+v over several weeks. Not affected by meals, normally 1x in the AM as of late. Non bloody, looked bilious per patient. Possibly in the setting of mesenteric vein thrombosis. Currently comfortable, last vomiting yesterday.    - Zofran 4mg IV q6 hours prn.  - AC as above. RESOLVED  Patient with n+v over several weeks. Not affected by meals, normally 1x in the AM as of late. Non bloody, looked bilious per patient. Possibly in the setting of mesenteric vein thrombosis. Currently comfortable, last vomiting yesterday.    Plan:  - Zofran 4mg IV q6 hours prn.  - AC as above.

## 2024-11-14 NOTE — DIETITIAN INITIAL EVALUATION ADULT - PERTINENT MEDS FT
MEDICATIONS  (STANDING):  apixaban 10 milliGRAM(s) Oral every 12 hours  influenza   Vaccine 0.5 milliLiter(s) IntraMuscular once  senna 2 Tablet(s) Oral every 24 hours    MEDICATIONS  (PRN):  ondansetron Injectable 4 milliGRAM(s) IV Push every 6 hours PRN Nausea and/or Vomiting  simethicone 80 milliGRAM(s) Chew every 6 hours PRN Gas

## 2024-11-14 NOTE — DISCHARGE NOTE PROVIDER - HOSPITAL COURSE
#Discharge: do not delete    Patient is __ yo M/F with past medical history of _____  Presented with _____, found to have _____  Problem List/Main Diagnoses (system-based):   Inpatient treatment course:   New medications:   Labs to be followed outpatient:   Exam to be followed outpatient:    #Discharge: do not delete    Patient is a 59M with PMH of htn presents with 3 weeks of n+v, nbnb emesis, diarrhea, and cramping, admitted for acute mesenteric vein thrombosis and portal vein thrombosis.     Hospital Course by Problem:    #Mesenteric vein thrombosis.   ·  Plan: No signs of rigid/guarding abdomen on exam. CT w/o necrosis. Family history of clotting however patient w/o personal history of coagulopathy. Weight and appetite stable per patient.  Started on heparin drip, transitioned to PO diet, vascular consulted, no acute interventions required. Transitioned to Lovenox 80mg Q12. Discharged home on Eliquis     with plans to followup outpatient with Hematology.       #Portal vein thrombosis.   CTAP showing acute thrombosis of superior mesenteric vein, l. branch portal vein, anterior division of r. portal vein. Moderate splenomegaly. Nonenlarged. 1.0 cm hypodensity in segment eight of liver cannot  be further characterized. Hypercoagulable workup sent. RUQ US and LE US without any acute pathology. Diet advanced from liquids to soft foods. Discharged home on Eliquis with hematology and PCP followups.       #Nausea & vomiting.   ·  Plan: Patient with n+v over several weeks. Not affected by meals, normally 1x in the AM as of late. Non bloody, looked bilious per patient. Possibly in the setting of mesenteric vein thrombosis. No episodes of vomiting during hospitalization.     - Zofran 4mg IV q6 hours prn.  - AC as above.    #Diarrhea.   ·  Plan: 1-2 episodes a day for last week. Initial wbc of 12 however on repeat of 6. Ddx low for infectious cause, more likely 2/2 the ischemia and thrombosis. Improving per patient.   - monitor sx for now.    #Hypertension.   ·  Plan: Home medication:. Losartan Potassium-HCTZ 100-12.5 MG qd.  - Restart home medications.      New medications: Eliquis     Labs to be followed outpatient:     Exam to be followed outpatient:   Constitutional: NAD, comfortable in bed.  HEENT: NC/AT, PERRLA, EOMI, no conjunctival pallor or scleral icterus, MMM  Neck: Supple, no JVD  Respiratory: CTA B/L. No w/r/r.   Cardiovascular: RRR, normal S1 and S2, no m/r/g.   Gastrointestinal: +BS, soft NTND, no guarding or rebound tenderness, no palpable masses. Some splenomegaly. Hepatic edge appropriate location.    Extremities: wwp; no cyanosis, clubbing or edema. Symmetrical and equal.   Vascular: Pulses equal and strong throughout.   Neurological: AAOx3, no CN deficits, strength and sensation intact throughout.   Skin: No gross skin abnormalities or rashes   #Discharge: do not delete    Patient is a 59M with PMH of htn presents with 3 weeks of n+v, nbnb emesis, diarrhea, and cramping, admitted for acute mesenteric vein thrombosis and portal vein thrombosis.     Hospital Course by Problem:    #Mesenteric vein thrombosis.   ·  Plan: No signs of rigid/guarding abdomen on exam. CT w/o necrosis. Family history of clotting however patient w/o personal history of coagulopathy. Weight and appetite stable per patient.  Started on heparin drip, transitioned to PO diet, vascular consulted, no acute interventions required. Transitioned to Lovenox 80mg Q12. Discharged home on Eliquis 10mg for 7 days followed by 5mg for a one month supply with instructions to followup with hematology for additional treatment.     with plans to followup outpatient with Hematology.       #Portal vein thrombosis.   CTAP showing acute thrombosis of superior mesenteric vein, l. branch portal vein, anterior division of r. portal vein. Moderate splenomegaly. Nonenlarged. 1.0 cm hypodensity in segment eight of liver cannot  be further characterized. Hypercoagulable workup sent. RUQ US and LE US without any acute pathology. Diet advanced from liquids to soft foods. Discharged home on Eliquis with hematology and PCP followups.       #Nausea & vomiting.   ·  Plan: Patient with n+v over several weeks. Not affected by meals, normally 1x in the AM as of late. Non bloody, looked bilious per patient. Possibly in the setting of mesenteric vein thrombosis. No episodes of vomiting during hospitalization.     - Zofran 4mg IV q6 hours prn.  - AC as above.    #Diarrhea.   1-2 episodes a day for last week. Initial wbc of 12 however on repeat of 6. Ddx low for infectious cause, more likely 2/2 the ischemia and thrombosis. Improving per patient.   - monitor sx for now.    #Hypertension.   Home medication:. Losartan Potassium-HCTZ 100-12.5 MG qd.  - Restart home medications.      New medications: Eliquis     Labs to be followed outpatient:     Exam to be followed outpatient:   Constitutional: NAD, comfortable in bed.  HEENT: NC/AT, PERRLA, EOMI, no conjunctival pallor or scleral icterus, MMM  Neck: Supple, no JVD  Respiratory: CTA B/L. No w/r/r.   Cardiovascular: RRR, normal S1 and S2, no m/r/g.   Gastrointestinal: +BS, soft NTND, no guarding or rebound tenderness, no palpable masses. Some splenomegaly. Hepatic edge appropriate location.    Extremities: wwp; no cyanosis, clubbing or edema. Symmetrical and equal.   Vascular: Pulses equal and strong throughout.   Neurological: AAOx3, no CN deficits, strength and sensation intact throughout.   Skin: No gross skin abnormalities or rashes   #Discharge: do not delete    Patient is a 59M with PMH of htn presents with 3 weeks of n+v, nbnb emesis, diarrhea, and cramping, admitted for acute mesenteric vein thrombosis and portal vein thrombosis.     Hospital Course by Problem:    #Mesenteric vein thrombosis.   ·  Plan: No signs of rigid/guarding abdomen on exam. CT w/o necrosis. Family history of clotting however patient w/o personal history of coagulopathy. Weight and appetite stable per patient.  Started on heparin drip, transitioned to PO diet, vascular consulted, no acute interventions required. Transitioned to Lovenox 80mg Q12. Discharged home on Eliquis 10mg for 7 days followed by 5mg for a one month supply with instructions to followup with hematology for additional treatment.     with plans to followup outpatient with Hematology.       #Portal vein thrombosis.   CTAP showing acute thrombosis of superior mesenteric vein, l. branch portal vein, anterior division of r. portal vein. Moderate splenomegaly. Nonenlarged. 1.0 cm hypodensity in segment eight of liver cannot  be further characterized. Hypercoagulable workup sent. RUQ US and LE US without any acute pathology. Diet advanced from liquids to soft foods. Discharged home on Eliquis with hematology and PCP followups.       #Nausea & vomiting.   ·  Plan: Patient with n+v over several weeks. Not affected by meals, normally 1x in the AM as of late. Non bloody, looked bilious per patient. Possibly in the setting of mesenteric vein thrombosis. No episodes of vomiting during hospitalization.     - Zofran 4mg IV q6 hours prn.  - AC as above.    #Diarrhea.   1-2 episodes a day for last week. Initial wbc of 12 however on repeat of 6. Ddx low for infectious cause, more likely 2/2 the ischemia and thrombosis. Improving per patient.   - monitor sx for now.    #Hypertension.   Home medication:. Losartan Potassium-HCTZ 100-12.5 MG qd.  - Restart home medications.      New medications: Eliquis 5mg     Labs to be followed outpatient: Factor V gene, Antiphospholipid, anticardiolipid, lupus AC.      Exam to be followed outpatient:   Constitutional: NAD, comfortable in bed.  HEENT: NC/AT, PERRLA, EOMI, no conjunctival pallor or scleral icterus, MMM  Neck: Supple, no JVD  Respiratory: CTA B/L. No w/r/r.   Cardiovascular: RRR, normal S1 and S2, no m/r/g.   Gastrointestinal: +BS, soft NTND, no guarding or rebound tenderness, no palpable masses. Some splenomegaly. Hepatic edge appropriate location.    Extremities: wwp; no cyanosis, clubbing or edema. Symmetrical and equal.   Vascular: Pulses equal and strong throughout.   Neurological: AAOx3, no CN deficits, strength and sensation intact throughout.   Skin: No gross skin abnormalities or rashes   Patient is a 59M with PMH of htn presents with 3 weeks of n+v, nbnb emesis, diarrhea, and cramping, admitted for acute mesenteric vein thrombosis and portal vein thrombosis.     Hospital Course by Problem:    #Mesenteric vein thrombosis.   ·  Plan: No signs of rigid/guarding abdomen on exam. CT w/o necrosis. Family history of clotting however patient w/o personal history of coagulopathy. Weight and appetite stable per patient.  Started on heparin drip, transitioned to PO diet, vascular consulted, no acute interventions required. Transitioned to Lovenox 80mg Q12. Discharged home on Eliquis 10mg for 7 days followed by 5mg for a one month supply with instructions to followup with hematology for additional treatment.     with plans to followup outpatient with Hematology.       #Portal vein thrombosis.   CTAP showing acute thrombosis of superior mesenteric vein, l. branch portal vein, anterior division of r. portal vein. Moderate splenomegaly. Nonenlarged. 1.0 cm hypodensity in segment eight of liver cannot  be further characterized. Hypercoagulable workup sent. RUQ US and LE US without any acute pathology. Diet advanced from liquids to soft foods. Discharged home on Eliquis with hematology and PCP followups.       #Nausea & vomiting.   ·  Plan: Patient with n+v over several weeks. Not affected by meals, normally 1x in the AM as of late. Non bloody, looked bilious per patient. Possibly in the setting of mesenteric vein thrombosis. No episodes of vomiting during hospitalization.     - Zofran 4mg IV q6 hours prn.  - AC as above.    #Diarrhea.   1-2 episodes a day for last week. Initial wbc of 12 however on repeat of 6. Ddx low for infectious cause, more likely 2/2 the ischemia and thrombosis. Improving per patient.   - monitor sx for now.    #Hypertension.   Home medication:. Losartan Potassium-HCTZ 100-12.5 MG qd.  - Restart home medications.      New medications: Eliquis 5mg     Labs to be followed outpatient: Factor V gene, Antiphospholipid, anticardiolipid, lupus AC.      Exam to be followed outpatient:   Constitutional: NAD, comfortable in bed.  HEENT: NC/AT, PERRLA, EOMI, no conjunctival pallor or scleral icterus, MMM  Neck: Supple, no JVD  Respiratory: CTA B/L. No w/r/r.   Cardiovascular: RRR, normal S1 and S2, no m/r/g.   Gastrointestinal: +BS, soft NTND, no guarding or rebound tenderness, no palpable masses. Some splenomegaly. Hepatic edge appropriate location.    Extremities: wwp; no cyanosis, clubbing or edema. Symmetrical and equal.   Vascular: Pulses equal and strong throughout.   Neurological: AAOx3, no CN deficits, strength and sensation intact throughout.   Skin: No gross skin abnormalities or rashes   Patient is a 59M with PMH of htn presents with 3 weeks of n+v, nbnb emesis, diarrhea, and cramping, admitted for acute mesenteric vein thrombosis and portal vein thrombosis.     Hospital Course by Problem:    #Mesenteric vein thrombosis.   ·  Plan: No signs of rigid/guarding abdomen on exam. CT w/o necrosis. Family history of clotting however patient w/o personal history of coagulopathy. Weight and appetite stable per patient.  Started on heparin drip, transitioned to PO diet, vascular consulted, no acute interventions required. Transitioned to Lovenox 80mg Q12. Discharged home on Eliquis 10mg for 7 days followed by 5mg for a one month supply with instructions to followup with hematology for additional treatment.     with plans to followup outpatient with Hematology.       #Portal vein thrombosis.   CTAP showing acute thrombosis of superior mesenteric vein, l. branch portal vein, anterior division of r. portal vein. Moderate splenomegaly. Nonenlarged. 1.0 cm hypodensity in segment eight of liver cannot  be further characterized. Hypercoagulable workup sent. RUQ US and LE US without any acute pathology. Diet advanced from liquids to soft foods. Discharged home on Eliquis with hematology and PCP followups.       #Nausea & vomiting.   ·  Plan: Patient with n+v over several weeks. Not affected by meals, normally 1x in the AM as of late. Non bloody, looked bilious per patient. Possibly in the setting of mesenteric vein thrombosis. No episodes of vomiting during hospitalization.     - Zofran 4mg IV q6 hours prn.  - AC as above.    #Diarrhea.   1-2 episodes a day for last week. Initial wbc of 12 however on repeat of 6. Ddx low for infectious cause, more likely 2/2 the ischemia and thrombosis. Improving per patient.   - monitor sx for now.    #Hypertension.   Home medication:. Losartan Potassium-HCTZ 100-12.5 MG qd.  - held home medications.      New medications: Eliquis 10mg BID for one week, 5mg BID afterwards.   Medications Held: Losartan Potassium-HCTZ 100-12.5 MG qd.    Labs to be followed outpatient: Factor V gene, Antiphospholipid, anticardiolipid, lupus AC.      Exam to be followed outpatient:   Constitutional: NAD, comfortable in bed.  HEENT: NC/AT, PERRLA, EOMI, no conjunctival pallor or scleral icterus, MMM  Neck: Supple, no JVD  Respiratory: CTA B/L. No w/r/r.   Cardiovascular: RRR, normal S1 and S2, no m/r/g.   Gastrointestinal: +BS, soft NTND, no guarding or rebound tenderness, no palpable masses. Some splenomegaly. Hepatic edge appropriate location.    Extremities: wwp; no cyanosis, clubbing or edema. Symmetrical and equal.   Vascular: Pulses equal and strong throughout.   Neurological: AAOx3, no CN deficits, strength and sensation intact throughout.   Skin: No gross skin abnormalities or rashes   Patient is a 59M with PMH of htn presents with 3 weeks of n+v, nbnb emesis, diarrhea, and cramping, admitted for acute mesenteric vein thrombosis and portal vein thrombosis.     Hospital Course by Problem:    #Mesenteric vein thrombosis.   ·  Plan: No signs of rigid/guarding abdomen on exam. CT w/o necrosis. Family history of clotting however patient w/o personal history of coagulopathy. Weight and appetite stable per patient.  Started on heparin drip, transitioned to PO diet, vascular consulted, no acute interventions required. Transitioned to Lovenox 80mg Q12. Discharged home on Eliquis 10mg for 7 days followed by 5mg for a one month supply with instructions to followup with hematology for additional treatment.     with plans to followup outpatient with Hematology.       #Portal vein thrombosis.   CTAP showing acute thrombosis of superior mesenteric vein, l. branch portal vein, anterior division of r. portal vein. Moderate splenomegaly. Nonenlarged. 1.0 cm hypodensity in segment eight of liver cannot  be further characterized. Hypercoagulable workup sent. RUQ US and LE US without any acute pathology. Diet advanced from liquids to soft foods. Discharged home on Eliquis with hematology and PCP followups.       #Nausea & vomiting.   ·  Plan: Patient with n+v over several weeks. Not affected by meals, normally 1x in the AM as of late. Non bloody, looked bilious per patient. Possibly in the setting of mesenteric vein thrombosis. No episodes of vomiting during hospitalization.     - Zofran 4mg IV q6 hours prn.  - AC as above.    #Diarrhea.   1-2 episodes a day for last week. Initial wbc of 12 however on repeat of 6. Ddx low for infectious cause, more likely 2/2 the ischemia and thrombosis. Improving per patient.   - monitor sx for now.    #Hypertension.   Home medication:. Losartan Potassium-HCTZ 100-12.5 MG qd.  - held home medications.      New medications: Eliquis 10mg BID for one week, 5mg BID afterwards.   Medications Held: Losartan Potassium-HCTZ 100-12.5 MG qd.    Labs to be followed outpatient: Factor V gene, Antiphospholipid, anticardiolipid, lupus AC.      Exam to be followed outpatient:   Constitutional: NAD, comfortable in bed.  HEENT: NC/AT, PERRLA, EOMI, no conjunctival pallor or scleral icterus, MMM  Neck: Supple, no JVD  Respiratory: CTA B/L. No w/r/r.   Cardiovascular: RRR, normal S1 and S2, no m/r/g.   Gastrointestinal: +BS, soft NTND, no guarding or rebound tenderness, no palpable masses. Some splenomegaly. Hepatic edge appropriate location.    Extremities: wwp; no cyanosis, clubbing or edema. Symmetrical and equal.   Vascular: Pulses equal and strong throughout.   Neurological: AAOx3, no CN deficits, strength and sensation intact throughout.   Skin: No gross skin abnormalities or rashes    Attending attestation  59M w HTN, EtOH use (3 drinks daily but recently cut down)p/w N/V, diarrhea, abd cramping, and dry cough (s/p azithromycin outpatient), for 3 weeks, in ED found to have Portal vein thrombosis and mesenteric vein thrombosis on CT A/P, elevated Lipase, started on heparin gtt, vascular sx consulted, transitioned to therapeutic lovenox.     Pt reports he tolerated breakfast (full liquid) w less pain than dinner last night. Does report some distension/bloating but it is improved v yesterday. Denies nausea. +Flatus wo BM.   Exam: abd non-distended, NABS, non-tender. male in NAD on RA, MMM, RRR, nml resp effort, CTAB, Alert, moving all ext.     #Portal vein thrombosis   - Abd doppler showing thrombus in portal vein. Occlusion in R anterior branch. BLE Dopplers negative.   #Mesenteric vein thrombosis   - Eliquis 10mg BID x7d, then eliquis 5mg BID for at least 3 months. Follow-up w DR. Bird - Hematology    #EtOH use - pt reports last drink when symptoms began. Overall does not appear in acute withdrawal.   #Leukocytosis - resolved.   #MARSHALL - Cr 1.42 on admission - now 0.9. Unclear baseline. Possibly pre-renal/volume down on presentation w recent N/V and diarrhea. UA wo pyuria, bacteruria  #HTN - holding losartan-hctz d/t MARSHALL and -110 while inpatient.  patient to hold BP meds on dc and resume once SBP >130.     Stable for DC home

## 2024-11-14 NOTE — DIETITIAN INITIAL EVALUATION ADULT - ADD RECOMMEND
1. Continue Regular diet.   >>Encourage & monitor PO intake. [place] dietary preferences as able.   2. Monitor GI tolerance, weight trends, labs, & skin integrity.  3. Defer bowel and pain regimens to team.   4. RD to remain available for diet education/intervention prn.

## 2024-11-14 NOTE — DIETITIAN INITIAL EVALUATION ADULT - PROBLEM SELECTOR PLAN 3
Patient with n+v over several weeks. Not affected by meals, normally 1x in the AM as of late. Non bloody, looked bilious per patient. Possibly in the setting of mesenteric vein thrombosis. Currently comfortable, last vomiting yesterday.    - Zofran 4mg IV q6 hours prn.  - AC as above.

## 2024-11-14 NOTE — DISCHARGE NOTE PROVIDER - NSDCCPCAREPLAN_GEN_ALL_CORE_FT
PRINCIPAL DISCHARGE DIAGNOSIS  Diagnosis: Superior mesenteric vein thrombosis  Assessment and Plan of Treatment: Superior Mesenteric Vein Thrombosis and Portal Vein Thrombus is a condition in which oneof the veins in the abdomen and liver become blocked by a blood clot. Most times, a thrombis is caused by blood clots that form in parts of the body due to conditions that cause you blood to clot more often than usual. Symptoms include abdominal pain, sometimes worse with food, nausea and vomiting. Prompt treatment to break up the clot greatly reduces the risk of death. This can be done with blood thinners and drugs or procedures. In your case, we believe you had a thrombis due to possible underlying condition given the family history of blood clots and no prior provoking conditions such as recent surgeries or injuries that would cause your blood to clot. Please continue to take your Eliquis medication as prescribed and follow up with your Primary Care Physician within 2 weeks of discharge.  Additionally, please followup with Hematology, Dr. Ferris. The appointment has been made for you and is in the discharge documentaiton.   PLEASE CALL: [phone] if you need to reschedule for any reason.         SECONDARY DISCHARGE DIAGNOSES  Diagnosis: Portal vein thrombosis  Assessment and Plan of Treatment:      PRINCIPAL DISCHARGE DIAGNOSIS  Diagnosis: Superior mesenteric vein thrombosis  Assessment and Plan of Treatment: Superior Mesenteric Vein Thrombosis and Portal Vein Thrombus is a condition in which oneof the veins in the abdomen and liver become blocked by a blood clot. Most times, a thrombis is caused by blood clots that form in parts of the body due to conditions that cause you blood to clot more often than usual. Symptoms include abdominal pain, sometimes worse with food, nausea and vomiting. Prompt treatment to break up the clot greatly reduces the risk of death. This can be done with blood thinners and drugs or procedures. In your case, we believe you had a thrombis due to possible underlying condition given the family history of blood clots and no prior provoking conditions such as recent surgeries or injuries that would cause your blood to clot. Please continue to take your Eliquis medication as prescribed and follow up with your Primary Care Physician within 2 weeks of discharge.  Additionally, please followup with Hematology, Dr. Bird. The appointment has been made for you and is in the discharge documentaiton.   PLEASE CALL: [phone] if you need to reschedule for any reason.         SECONDARY DISCHARGE DIAGNOSES  Diagnosis: Portal vein thrombosis  Assessment and Plan of Treatment:      PRINCIPAL DISCHARGE DIAGNOSIS  Diagnosis: Superior mesenteric vein thrombosis  Assessment and Plan of Treatment: Superior Mesenteric Vein Thrombosis and Portal Vein Thrombus is a condition in which oneof the veins in the abdomen and liver become blocked by a blood clot. Most times, a thrombis is caused by blood clots that form in parts of the body due to conditions that cause you blood to clot more often than usual. Symptoms include abdominal pain, sometimes worse with food, nausea and vomiting. Prompt treatment to break up the clot greatly reduces the risk of death. This can be done with blood thinners and drugs or procedures. In your case, we believe you had a thrombis due to possible underlying condition given the family history of blood clots and no prior provoking conditions such as recent surgeries or injuries that would cause your blood to clot. Please continue to take your Eliquis start with 10mg twice a week for one week, after which you will take 5mg twice a week until your appointment with Dr. Bird.   Please followup with Hematology, Dr. Bird. The appointment has been made for you and is in the discharge documentaiton.   PLEASE CALL: [phone] if you need to reschedule for any reason.   Additionally, Please stop taking your blood pressure medication until you followup appointment with your primary care physician Dr. Beaver, at which time he may choose to restart it. During your hospitalization your blood pressure was within goal and we think it will remain so after leaving the hospital. However, you primary care doctor will help decide that with you in the long term.         SECONDARY DISCHARGE DIAGNOSES  Diagnosis: Portal vein thrombosis  Assessment and Plan of Treatment:

## 2024-11-14 NOTE — DISCHARGE NOTE PROVIDER - PROVIDER TOKENS
PROVIDER:[TOKEN:[[id number]],FOLLOWUP:[2 weeks],ESTABLISHEDPATIENT:[T]] PROVIDER:[TOKEN:[[id number]]],PROVIDER:[TOKEN:[[id number]],SCHEDULEDAPPT:[11/26/2024],SCHEDULEDAPPTTIME:[02:00 PM]]

## 2024-11-14 NOTE — PHYSICAL THERAPY INITIAL EVALUATION ADULT - PERTINENT HX OF CURRENT PROBLEM, REHAB EVAL
Pt. is a 59 y.o male p/w 3 weeks of nausea, emesis, diarrhea, abdominal pain, found to have mesenteric and portal vein thrombosis, started on Heparin gtt, transitioned to Lovenox.

## 2024-11-14 NOTE — PROGRESS NOTE ADULT - PROBLEM SELECTOR PLAN 4
1-2 episodes a day for last week. Initial wbc of 12 however on repeat of 6. Ddx low for infectious cause, more likely 2/2 the ischemia and thrombosis. Improving per patient.   - monitor sx for now. 1-2 episodes a day for last week. Initial wbc of 12 however on repeat of 6. Ddx low for infectious cause, more likely 2/2 the ischemia and thrombosis. Improving per patient.  Passing gas and had 1 BM today after eating lunch, nonbloody      Plan:  - monitor sx for now.

## 2024-11-14 NOTE — DIETITIAN INITIAL EVALUATION ADULT - PERTINENT LABORATORY DATA
11-14    138  |  105  |  10  ----------------------------<  94  4.0   |  22  |  0.93    Ca    7.6[L]      14 Nov 2024 08:13  Phos  2.5     11-14  Mg     1.7     11-14    TPro  6.7  /  Alb  3.0[L]  /  TBili  0.9  /  DBili  x   /  AST  63[H]  /  ALT  17  /  AlkPhos  83  11-14

## 2024-11-14 NOTE — DISCHARGE NOTE PROVIDER - NSDCCPTREATMENT_GEN_ALL_CORE_FT
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  Acute thrombosis seen in the left branch portal vein as well as in the   anterior division of the right portal vein.  Could represent hypercoagulability /thrombotic tendency condition .  Moderate splenomegaly.        SECONDARY PROCEDURE  Procedure: US abdomen RUQ  Findings and Treatment: FINDINGS:  Liver: Within normal limits.  Liver Vasculature:  Hepatic arteries:  Main: Patent. 76 cm/s.Resistive index 0.8.  Portal Veins:  Main: Patent. 36 cm/s. Normal direction: Yes  Right: Anterior division is thrombosed.  Left: Partially Thrombosed.  Normal direction: Yes  Splenic Vein: Patent with phasic waveforms.  IVC:Patent with phasic waveforms.  Hepatic Veins:  Right: Patent with phasic waveforms.  Middle: Patent with phasic waveforms.  Left: Patent with phasic waveforms.  Bile ducts: Normal caliber.  Gallbladder: Moderate sludge. No stones or wall thickening or signs of   cholecystitis.  Ascites: None.  IMPRESSION:  Since November 12, 2024, unchanged extent of portal vein branch   thrombosis.  Sludge in gallbladder.

## 2024-11-14 NOTE — PROGRESS NOTE ADULT - PROBLEM SELECTOR PLAN 6
F: None   E: Replete as necessary K>4 Mg>2  N: Regular Diet   DVT Prophylaxis: Lovenox 80 BID  GI prophylaxis: None   CODE STATUS: FULL  DISPO: [facility] F: None   E: Replete as necessary K>4 Mg>2  N: Soft food diet   DVT Prophylaxis: Lovenox 80 BID  GI prophylaxis: None   CODE STATUS: FULL  DISPO: [facility]

## 2024-11-14 NOTE — PROGRESS NOTE ADULT - PROBLEM SELECTOR PLAN 2
CTAP showing acute thrombosis of superior mesenteric vein, l. branch portal vein, anterior division of r. portal vein. Moderate splenomegaly. Nonenlarged. 1.0 cm hypodensity in segment eight of liver cannot  be further characterized.  RUQ US and LE US without any acute pathology     Plan   - f/u heme onc recs and hypercoaguable workup results CTAP showing acute thrombosis of superior mesenteric vein, l. branch portal vein, anterior division of r. portal vein. Moderate splenomegaly. Nonenlarged. 1.0 cm hypodensity in segment eight of liver cannot  be further characterized.  RUQ US and LE US without any acute pathology    Plan   - f/u heme onc recs and hypercoaguable workup results

## 2024-11-14 NOTE — DISCHARGE NOTE NURSING/CASE MANAGEMENT/SOCIAL WORK - PATIENT PORTAL LINK FT
You can access the FollowMyHealth Patient Portal offered by [facility] by registering at the following website: [email or web address]. By joining [email or web address]’s FollowMyHealth portal, you will also be able to view your health information using other applications (apps) compatible with our system.

## 2024-11-14 NOTE — SBIRT NOTE ADULT - NSSBIRTFEELGUILT_GEN_A_CORE
Weekly
Monthly

## 2024-11-14 NOTE — DISCHARGE NOTE PROVIDER - NSDCFUSCHEDAPPT_GEN_ALL_CORE_FT
Thomas Beaver  [facility]  [address]  Scheduled Appointment: 12/17/2024     Azeem Bird  [facility]  [facility] [address]  Scheduled Appointment: 11/26/2024    Thomas Beaver  [facility]  [address]  Scheduled Appointment: 12/17/2024

## 2024-11-14 NOTE — PROGRESS NOTE ADULT - PROBLEM SELECTOR PLAN 1
No signs of rigid/guarding abdomen on exam. CT w/o necrosis. Family history of clotting however patient w/o personal history of coagulopathy. Weight and appetite stable per patient.   Started on heparin drip, transitioned to PO diet, vascular consulted, no acute interventions required. Transitioned to Lovenox 80mg Q12.     Plan   - c/w Lovenox 80 BID with plans to transition to Eliquis   - Advance diet as tolerated   - hemonc consulted, appreciate recs   - q6 abdominal check  - IVF: NS @100cc/hour x 12 hours  - Lactate in AM   - f/u vascular consult in AM.   - If acutely worsening would recommend surgical consult.   - consider Heme onc in AM and sending hypercoagulabe workup including: protein C, Protein S, factor V leiden, Antiphospholipid, anticardiolipid, lupus AC. No signs of rigid/guarding abdomen on exam. CT w/o necrosis. Family history of clotting however patient w/o personal history of coagulopathy. Weight and appetite stable per patient.   Started on heparin drip, transitioned to PO diet, vascular consulted, no acute interventions required. Transitioned to Lovenox 80mg Q12.   Tolerating soft food diet with no postprandial abdominal pain or complications       Plan   - c/w Lovenox 80 BID with plans to transition to Eliquis   - Advance diet as tolerated   - f/u heme/onc recs   - q6 abdominal check  - f/u vascular recs   - f/u hypercoagulable workup: protein C, Protein S, factor V leiden, Antiphospholipid, anticardiolipid, lupus AC No signs of rigid/guarding abdomen on exam. CT w/o necrosis. Family history of clotting however patient w/o personal history of coagulopathy. Weight and appetite stable per patient.   Started on heparin drip, transitioned to PO diet, vascular consulted, no acute interventions required. Transitioned to Lovenox 80mg Q12.   Tolerating soft food diet with no postprandial abdominal pain or complications   Vascular consulted, no interventions indicated.       Plan   - c/w Lovenox 80 BID with plans to transition to Eliquis   - Advance diet as tolerated   - f/u heme/onc recs, will followup outpatient   - q6 abdominal check  - f/u hypercoagulable workup: protein C, Protein S, factor V leiden, Antiphospholipid, anticardiolipid, lupus AC

## 2024-11-14 NOTE — SBIRT NOTE ADULT - NSSBIRTALCPOSREINDET_GEN_A_CORE
Patient reports that he recently stopped drinking 3 weeks ago due to his acute health conditions and has lately "told himself" he "should cut down."  provided supportive reinforcement for patient's own feelings about alcohol use cessation.

## 2024-11-14 NOTE — CONSULT NOTE ADULT - SUBJECTIVE AND OBJECTIVE BOX
Hematology Consult Note    59M with PMH of HTN presents with 3 weeks of n+v, emesis, abdominal cramping. This occurred acutely starting with vomiting approximately 3x a day, more recently down to 1x and turning from recently ingested food to green colored. His diarrhea has also been ongoing for about 1 weeks, approximately 1-2x a day, and streaks of brb (although no more than usual; he has known hemorrhoids), improving through the week.  He denies hematochezia, melena, fevers, chills, dysuria, dyspnea. Due ot these symptoms, today he was seen as his PCP's office and recommended to come to the hospital.     He adds that his mother  of a pulmonary embolism and his sister has had some blood clots; he believes she is currently on Xarelto. No known coagulopathy.    Consults: Vascular  Interventions: Toradol, Heparin   (2024 01:45)    Allergies  No Known Allergies  Intolerances    MEDICATIONS  (STANDING):  heparin  Infusion 1500 Unit(s)/Hr (15 mL/Hr) IV Continuous <Continuous>  influenza   Vaccine 0.5 milliLiter(s) IntraMuscular once  sodium chloride 0.9%. 1000 milliLiter(s) (80 mL/Hr) IV Continuous <Continuous>    MEDICATIONS  (PRN):  ondansetron Injectable 4 milliGRAM(s) IV Push every 6 hours PRN Nausea and/or Vomiting    PAST MEDICAL & SURGICAL HISTORY:  Hypertension    SOCIAL HISTORY: No EtOH, no tobacco    Height (cm): 180.3 ( @ 02:15)  Weight (kg): 88.904 ( @ 02:15)  BMI (kg/m2): 27.3 (:15)  BSA (m2): 2.09 ( 02:15)    T(F): 98.3 (24 @ 05:12), Max: 99.3 (24 @ 13:32)  HR: 74 (24 @ 05:12)  BP: 119/75 (24 @ 05:12)  RR: 15 (24 @ 05:12)  SpO2: 94% (24 @ 05:12)  Wt(kg): --    GENERAL: NAD, well-developed  NECK: Supple, No JVD  CHEST/LUNG: CTABL  HEART: Regular rate and rhythm  ABDOMEN: Soft, Nontender, Nondistended  EXTREMITIES:  No edema noted  NEUROLOGY: AAOx3, CN grossly intact   SKIN: No rashes or lesions                          11.1   8.70  )-----------( 192      ( 2024 05:30 )             34.4           137  |  105  |  14  ----------------------------<  98  4.0   |  23  |  1.00    Ca    7.9[L]      2024 05:30  Phos  3.2       Mg     1.8         TPro  6.9  /  Alb  3.1[L]  /  TBili  0.9  /  DBili  x   /  AST  58[H]  /  ALT  16  /  AlkPhos  78        Magnesium: 1.8 mg/dL ( @ 05:30)  Phosphorus: 3.2 mg/dL ( @ 05:30)      
58yo Male pt with PMH of HTN and no PSHx presenting to [facility] ED from outpatient PCP office for 2.5wks hx of nausea, nbnb emesis, and diarrhea. Patient states he initially felt unwell 2.5wks agowith subjective fever and believed symptoms to be related to undiagnosed viral illness however continuation of nausea, emesis, diarrhea, and abdominal bloating brought him to his PCP who recommend ED presentation. In the ED, patient's labs notable for leukocytosis (12), Cr 1.42 (unclear baseline) with negative UA, fecal occult positive, lactate 1.5, and H/H 12.7/39.8. CT A/P with IV contrast was obtained with findings of acute thrombus of SMV, thrombus of left branch of portal vein and thrombus of anterior division of right portal vein. Patient was started on hep gtt and transferred to [facility] for further workup. Heme onc was consulted for further hypercoagulability workup. BLE venous duplex was obtained with no DVT noted and US abdomen was obtained with findings of right anterior portal vein thrombosis and partial thrombosis of left portal vein. Vasculare surgery was consulted for portal vein thrombosis.    On evaluation by vascular surgery team, patient states he feels well with no complaints of abdominal pain, nausea, emesis, diarrhea, chest pain, SOB, fever/chills, lightheadedness or dizziness. Patient reports no hx of bleeding or blood clotting disorders but does report family hx of blood clots including PE in mother and sister currently on Xarelto for unknown reasons. Patient is a former daily drinker 3 drinks/day however reports quitting since onset of symptoms and denies any withdrawal symptoms. Patient denies any tobacco or recreational drug use. Patient denies ever seeing a vascular surgeon before and reports no issues with mobility and is active at baseline.      - Afebrile, nontachycardic, normotensive, and satting well on RA   T(F): 98.3, Max: 98.6 (11-13-24 @ 02:15)  HR: 74 (70 - 93)  BP: 119/75 (119/75 - 141/83)  RR: 15 (15 - 18)  SpO2: 94% (94% - 100%)      Physical Exam  General: AAOx3, NAD, laying comfortably in bed  Cardio: RRR  Pulm: Lungs bilaterally clear to auscultation  Abdomen: soft, mildly distended, NT, no rebound or guarding  Extremities: WWP, peripheral pulses appreciated                          11.1   8.70  )-----------( 192      ( 13 Nov 2024 05:30 )             34.4     11-13    137  |  105  |  14  ----------------------------<  98  4.0   |  23  |  1.00    Ca    7.9[L]      13 Nov 2024 05:30  Phos  3.2     11-13  Mg     1.8     11-13    TPro  6.9  /  Alb  3.1[L]  /  TBili  0.9  /  DBili  x   /  AST  58[H]  /  ALT  16  /  AlkPhos  78  11-13    LIVER FUNCTIONS - ( 13 Nov 2024 05:30 )  Alb: 3.1 g/dL / Pro: 6.9 g/dL / ALK PHOS: 78 U/L / ALT: 16 U/L / AST: 58 U/L / GGT: x           PT/INR - ( 13 Nov 2024 05:30 )   PT: 13.5 sec;   INR: 1.18          ·	PTT - ( 13 Nov 2024 05:30 )  PTT:96.9 sec
  Patient is a 59y old  Male who presents with a chief complaint of     HPI:  59M with PMH of htn presents with 3 weeks of n+v, emesis, abdominal cramping. This occurred acutely starting with vomiting approximately 3x a day, more recently down to 1x and turning from recently ingested food to green colored. His diarrhea has also been ongoing for about 1 weeks, approximately 1-2x a day, and streaks of brb (although no more than usual; he has known hemorrhoids), improving through the week.  He denies hematochezia, melena, fevers, chills, dysuria, dyspnea. Due ot these symptoms, today he was seen as his PCP's office and recommended to come to the hospital.     He adds that his mother  of a pulmonary embolism and his sister has had some blood clots; he believes she is currently on Xarelto. No known coagulopathy.    ED Course:  T98 ->90, /83 RR16 Sat 97% on RA   Significant labs: Hb 9.3->11.1 on repeat. PTT 34 -> 91.4; INR 1.24, PT 14. Cr 1.42 AST/ALT/ALP: 60/57/143. Lactate 1.3  Imaging CTAP showing acute thrombosis of superior mesenteric vein, l. branch portal vein, anterior division of r. portal vein. Moderate splenomegaly. Nonenlarged. 1.0 cm hypodensity in segment eight of liver cannot   be further characterized..    Consults: Vascular  Interventions: Toradol, Heparin   (2024 01:45)    PAST MEDICAL & SURGICAL HISTORY:  Hypertension        MEDICATIONS  (STANDING):  enoxaparin Injectable 90 milliGRAM(s) SubCutaneous every 12 hours  influenza   Vaccine 0.5 milliLiter(s) IntraMuscular once  polyethylene glycol 3350 17 Gram(s) Oral every 12 hours  senna 2 Tablet(s) Oral at bedtime    MEDICATIONS  (PRN):  ondansetron Injectable 4 milliGRAM(s) IV Push every 6 hours PRN Nausea and/or Vomiting          FAMILY HISTORY:      CBC Full  -  ( 2024 05:30 )  WBC Count : 8.70 K/uL  RBC Count : 4.15 M/uL  Hemoglobin : 11.1 g/dL  Hematocrit : 34.4 %  Platelet Count - Automated : 192 K/uL  Mean Cell Volume : 82.9 fl  Mean Cell Hemoglobin : 26.7 pg  Mean Cell Hemoglobin Concentration : 32.3 g/dL  Auto Neutrophil # : x  Auto Lymphocyte # : x  Auto Monocyte # : x  Auto Eosinophil # : x  Auto Basophil # : x  Auto Neutrophil % : x  Auto Lymphocyte % : x  Auto Monocyte % : x  Auto Eosinophil % : x  Auto Basophil % : x      11    137  |  105  |  14  ----------------------------<  98  4.0   |  23  |  1.00    Ca    7.9[L]      2024 05:30  Phos  3.2       Mg     1.8         TPro  6.9  /  Alb  3.1[L]  /  TBili  0.9  /  DBili  x   /  AST  58[H]  /  ALT  16  /  AlkPhos  78        Urinalysis Basic - ( 2024 05:30 )    Color: x / Appearance: x / SG: x / pH: x  Gluc: 98 mg/dL / Ketone: x  / Bili: x / Urobili: x   Blood: x / Protein: x / Nitrite: x   Leuk Esterase: x / RBC: x / WBC x   Sq Epi: x / Non Sq Epi: x / Bacteria: x        Radiology :     < from: CT Abdomen and Pelvis w/ IV Cont (24 @ 14:09) >  ACC: [id number] EXAM:  CT ABDOMEN AND PELVIS IC   ORDERED BY: MARLO DAVIS     PROCEDURE DATE:  2024          INTERPRETATION:  CLINICAL INFORMATION: Abdominal pain. 59-year-old male.    COMPARISON: None.    CONTRAST/COMPLICATIONS:  IV Contrast: Omnipaque 350  96 cc administered   4 cc discarded  Oral Contrast: NONE  Complications: None reported at time of study completion    PROCEDURE:  CT of the Abdomen and Pelvis was performed.  Sagittal and coronal reformats were performed.    FINDINGS:  LOWER CHEST: Within normal limits.    LIVER: Nonenlarged. 1.0 cm hypodensity in segment eight of liver cannot   be further characterized..  BILE DUCTS: Normal caliber.  GALLBLADDER: Within normal limits.  SPLEEN: Moderate splenomegaly. Splenic APdiameter measures 19.1 cm. 2.2   cm hypodensity in superior spleen is nonspecific could represent cyst or   hemangioma.  PANCREAS: Within normal limits.  ADRENALS: Within normal limits.  KIDNEYS/URETERS: Within normal limits.    BLADDER: Within normal limits.  REPRODUCTIVE ORGANS: Unremarkable seminal vesicles. The prostate measures   3.3 x 4.2 x 3.8 cm.    BOWEL: Mid evaluation of bowel due to lack of oral contrast material. No   bowel obstruction or ileus. No evidence of acute appendicitis.  PERITONEUM/RETROPERITONEUM: Trace ascites. Mild mesenteric edema.  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 Acute thrombus   seen in the left branch portal vein as well as the anterior division of   the right portal vein.  LYMPH NODES:No lymphadenopathy.  ABDOMINAL WALL: Within normal limits.  BONES: Within normal limits.    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  Acute thrombosis seen in the left branch portal vein as well as in the   anterior division of the right portal vein.  Could represent hypercoagulability /thrombotic tendency condition .  Moderate splenomegaly.           Review of Systems : per HPI         Vital Signs Last 24 Hrs  T(C): 36.7 (2024 05:53), Max: 36.9 (2024 20:41)  T(F): 98 (2024 05:53), Max: 98.4 (2024 20:41)  HR: 74 (2024 05:53) (74 - 96)  BP: 117/74 (2024 05:53) (112/79 - 117/74)  BP(mean): 90 (:41) (90 - 90)  RR: 16 (2024 05:53) (16 - 16)  SpO2: 96% (2024 05:53) (93% - 96%)    Parameters below as of 2024 20:41  Patient On (Oxygen Delivery Method): room air            Physical Exam:  59 y o man lying comfortably in semi Leslie's position , awake , alert , no acute complaints     Head: normocephalic , atraumatic    Eyes: PERRLA , EOMI , no nystagmus , sclera anicteric    ENT / FACE: neg nasal discharge , uvula midline , no oropharyngeal erythema / exudate    Neck: supple , negative JVD , negative carotid bruits , no thyromegaly    Chest: CTA bilaterally , neg wheeze / rhonchi / rales / crackles / egophany    Cardiovascular: regular rate and rhythm , neg murmurs / rubs / gallops    Abdomen: soft , midly distended , no tenderness to palpation in all 4 quadrants ,  normal bowel sounds     Extremities: WWP , neg cyanosis /clubbing / edema     Neurologic Exam:     Alert and oriented  x 3    Motor Exam:        > 4/5 x 4 extremities      Sensation:         intact to light touch x 4 extremities     DTR:           biceps/brachioradialis: equal                           Gait:  not tested         PM&R Impression: admitted for c/o n/v , diarrhea and abdominal pain     - acute mesenteric vein thrombosis and portal vein thrombosis on CT brain imaging         Recommendations / Plan:       1) Physical / Occupational therapy focusing on therapeutic exercises , equipment evaluation , bed mobility/transfer out of bed evaluation , progressive ambulation with assistive devices prn .    2) Current disposition plan recommendation:    pending functional progress

## 2024-11-14 NOTE — DIETITIAN INITIAL EVALUATION ADULT - PROBLEM SELECTOR PLAN 4
1-2 episodes a day for last week. Initial wbc of 12 however on repeat of 6. Ddx low for infectious cause, more likely 2/2 the ischemia and thrombosis. Improving per patient.   - monitor sx for now.

## 2024-11-14 NOTE — CONSULT NOTE ADULT - ASSESSMENT
I M    59 y o M with PMH of htn presents with 3 weeks of n+v, nbnb emesis, diarrhea, and cramping, admitted for acute mesenteric vein thrombosis and portal vein thrombosis.     Problem/Plan - 1:  ·  Problem: Mesenteric vein thrombosis.   ·  Plan: No signs of rigid/guarding abdomen on exam. CT w/o necrosis. Family history of clotting however patient w/o personal history of coagulopathy. Weight and appetite stable per patient. Due to nausea, vomiting, would keep anticoagulated with bowel rest at this time; serial abominal checks and hypercoagulable workup should be maintained.   - c/w Heparin, q6 PTT  - NPO for now  - Maintenance fluids   - q4-q6 abdominal check  - IVF: NS @100cc/hour x 12 hours  - Lactate in AM   - f/u vascular consult in AM.   - If acutely worsening would recommend surgical consult.   - consider Heme onc in AM and sending hypercoagulabe workup including: protein C, Protein S, factor V leiden, Antiphospholipid, anticardiolipid, lupus AC.    Problem/Plan - 2:  ·  Problem: Portal vein thrombosis.   ·  Plan: CTAP showing acute thrombosis of superior mesenteric vein, l. branch portal vein, anterior division of r. portal vein. Moderate splenomegaly. Nonenlarged. 1.0 cm hypodensity in segment eight of liver cannot  be further characterized..    - RUQ US of portal vein for better characterization.   - Hepatology consult in AM.    Problem/Plan - 3:  ·  Problem: Nausea & vomiting.   ·  Plan: Patient with n+v over several weeks. Not affected by meals, normally 1x in the AM as of late. Non bloody, looked bilious per patient. Possibly in the setting of mesenteric vein thrombosis. Currently comfortable, last vomiting yesterday.    - Zofran 4mg IV q6 hours prn.  - AC as above.    Problem/Plan - 4:  ·  Problem: Diarrhea.   ·  Plan: 1-2 episodes a day for last week. Initial wbc of 12 however on repeat of 6. Ddx low for infectious cause, more likely 2/2 the ischemia and thrombosis. Improving per patient.   - monitor sx for now.    Problem/Plan - 5:  ·  Problem: Hypertension.   ·  Plan: Home medication:. Losartan Potassium-HCTZ 100-12.5 MG qd.  - Restart home medications.    Problem/Plan - 6:  ·  Problem: Prophylactic measure.   ·  Plan: F: None   E: Replete as necessary K>4 Mg>2  N: NPO diet   DVT Prophylaxis: Heparin  GI prophylaxis: None   CODE STATUS: FULL  DISPO: F.

## 2024-11-14 NOTE — DIETITIAN INITIAL EVALUATION ADULT - PROBLEM SELECTOR PLAN 6
F: None   E: Replete as necessary K>4 Mg>2  N: NPO diet   DVT Prophylaxis: Heparin  GI prophylaxis: None   CODE STATUS: FULL  DISPO: [facility]

## 2024-11-14 NOTE — DIETITIAN INITIAL EVALUATION ADULT - PROBLEM SELECTOR PLAN 1
No signs of rigid/guarding abdomen on exam. CT w/o necrosis. Family history of clotting however patient w/o personal history of coagulopathy. Weight and appetite stable per patient. Due to nausea, vomiting, would keep anticoagulated with bowel rest at this time; serial abominal checks and hypercoagulable workup should be maintained.   - c/w Heparin, q6 PTT  - NPO for now  - Maintenance fluids   - q4-q6 abdominal check  - IVF: NS @100cc/hour x 12 hours  - Lactate in AM   - f/u vascular consult in AM.   - If acutely worsening would recommend surgical consult.   - consider Heme onc in AM and sending hypercoagulabe workup including: protein C, Protein S, factor V leiden, Antiphospholipid, anticardiolipid, lupus AC.

## 2024-11-14 NOTE — DIETITIAN INITIAL EVALUATION ADULT - PROBLEM SELECTOR PLAN 2
CTAP showing acute thrombosis of superior mesenteric vein, l. branch portal vein, anterior division of r. portal vein. Moderate splenomegaly. Nonenlarged. 1.0 cm hypodensity in segment eight of liver cannot  be further characterized..    - RUQ US of portal vein for better characterization.   - Hepatology consult in AM

## 2024-11-14 NOTE — DISCHARGE NOTE NURSING/CASE MANAGEMENT/SOCIAL WORK - FINANCIAL ASSISTANCE
[facility] provides services at a reduced cost to those who are determined to be eligible through [facility]’s financial assistance program. Information regarding [facility]’s financial assistance program can be found by going to [email or web address] or by calling [phone].

## 2024-11-14 NOTE — DIETITIAN INITIAL EVALUATION ADULT - OTHER INFO
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 Vascular consulted, acute surgical intervention at this time.    Pt seen on 7WO for assessment. Labs and medication orders reviewed. Electrolytes WNL. On Full Liquid diet at time of visit, now advanced to Regular. Pt reports poor intake secondary to abdominal pain and nausea/vomiting ~x2 weeks PTA. Endorses nausea/vomiting now resolved, notes abdominal pain/bloating following completion of liquid tray last night; RD observed pt consuming liquids this AM with good tolerance - antiemetic and antiflatulent ordered. Per RN, pt able to complete breakfast tray without discomfort and diet advanced. Pt reports UBW ~200lb x2 weeks ago; current admission wt 196lb/88.9kg indicates 2% wt loss - not clinically significant. Nutrition-focused physical examination insignificant for overt signs of wasting. Per ASPEN guidelines, pt does not meet objective criteria for malnutrition at this time. Pt denies difficulty chewing/swallowing. Reports last BM x3d ago loose - bowel regimen ordered. Confirms no known food allergies. No Sikhism/ethnic/cultural food preferences noted. No pressure injuries or edema documented, Reagan score 22. See nutrition recommendations. RD to remain available.

## 2024-11-14 NOTE — DIETITIAN INITIAL EVALUATION ADULT - EDUCATION DIETARY MODIFICATIONS
Educated on strategies to maximize intake and nutritional status, discussed foods likely to be best tolerated. Encouraged prioritizing lean proteins and good hydration. RD answered all pt questions. Pt aware RD remains available for additional questions/concerns./(2) meets goals/outcomes/verbalization Educated on strategies to maximize intake and nutritional status. Discussed foods likely to be best tolerated and limiting high fat/high fiber foods with gradual reintroduction pending tolerance. Encouraged prioritizing lean proteins and good hydration. RD answered all pt questions. Pt aware RD remains available for additional questions/concerns./(2) meets goals/outcomes/verbalization

## 2024-11-14 NOTE — SBIRT NOTE ADULT - NSSBIRTALCPASSREFTXDET_GEN_A_CORE
Patient reports that he stopped drinking alcohol three weeks ago due to his illness and feels that upon discharge, he will decrease his drinking. Patient declined resources for alcohol cessation and will access programs on his own if need be. 
Patient reports that his baseline drinking includes having social drinks several evenings each week. Patient reports having 1-2, and sometimes 3, glasses of wine or cocktails on these evenings. Patient expressed that he feels he should "cut down" and that his hospitalization has encouraged this line of thinking. Patient declined alcohol cessation resources and reports that he feels like he can access assistance as needed by himself.

## 2024-11-14 NOTE — PROGRESS NOTE ADULT - SUBJECTIVE AND OBJECTIVE BOX
***Note in progress***    OVERNIGHT EVENTS: NAEO    SUBJECTIVE / INTERVAL HPI: Patient seen and examined at bedside. Patient denying chest pain, SOB, palpitations, cough. Patient denies fever, chills, HA, Dizziness, N/V, abdominal pain, diarrhea, constipation, hematochezia/melena, dysuria, hematuria, new onset weakness/numbness, LE pain and/or swelling.    Remaining ROS negative       PHYSICAL EXAM:    General:NAD.   HEENT: NC/AT; PERRL, anicteric sclera; MMM  Neck: supple  Cardiovascular: +S1/S2, RRR  Respiratory: CTA B/L; no W/R/R  Gastrointestinal: soft, NT/ND; +BSx4  Extremities: WWP; no edema, clubbing or cyanosis  Vascular: 2+ radial, DP/PT pulses B/L  Neurological: AAOx3; no focal deficits  Psychiatric: pleasant mood and affect  Dermatologic: no appreciable wounds or damage to the skin    VITAL SIGNS:  Vital Signs Last 24 Hrs  T(C): 36.7 (14 Nov 2024 05:53), Max: 36.9 (13 Nov 2024 20:41)  T(F): 98 (14 Nov 2024 05:53), Max: 98.4 (13 Nov 2024 20:41)  HR: 74 (14 Nov 2024 05:53) (74 - 96)  BP: 117/74 (14 Nov 2024 05:53) (112/79 - 117/74)  BP(mean): 90 (13 Nov 2024 20:41) (90 - 90)  RR: 16 (14 Nov 2024 05:53) (16 - 16)  SpO2: 96% (14 Nov 2024 05:53) (93% - 96%)    Parameters below as of 13 Nov 2024 20:41  Patient On (Oxygen Delivery Method): room air        MEDICATIONS:  MEDICATIONS  (STANDING):  enoxaparin Injectable 90 milliGRAM(s) SubCutaneous every 12 hours  influenza   Vaccine 0.5 milliLiter(s) IntraMuscular once  sodium chloride 0.9%. 1000 milliLiter(s) (80 mL/Hr) IV Continuous <Continuous>    MEDICATIONS  (PRN):  ondansetron Injectable 4 milliGRAM(s) IV Push every 6 hours PRN Nausea and/or Vomiting      ALLERGIES:  Allergies    No Known Allergies    Intolerances        LABS:                        11.1   8.70  )-----------( 192      ( 13 Nov 2024 05:30 )             34.4     11-13    137  |  105  |  14  ----------------------------<  98  4.0   |  23  |  1.00    Ca    7.9[L]      13 Nov 2024 05:30  Phos  3.2     11-13  Mg     1.8     11-13    TPro  6.9  /  Alb  3.1[L]  /  TBili  0.9  /  DBili  x   /  AST  58[H]  /  ALT  16  /  AlkPhos  78  11-13    PT/INR - ( 13 Nov 2024 05:30 )   PT: 13.5 sec;   INR: 1.18          PTT - ( 13 Nov 2024 16:55 )  PTT:115.2 sec  Urinalysis Basic - ( 13 Nov 2024 05:30 )    Color: x / Appearance: x / SG: x / pH: x  Gluc: 98 mg/dL / Ketone: x  / Bili: x / Urobili: x   Blood: x / Protein: x / Nitrite: x   Leuk Esterase: x / RBC: x / WBC x   Sq Epi: x / Non Sq Epi: x / Bacteria: x      CAPILLARY BLOOD GLUCOSE          RADIOLOGY & ADDITIONAL TESTS: Reviewed. ***Note in progress***    OVERNIGHT EVENTS: NAEO    SUBJECTIVE / INTERVAL HPI: Patient seen and examined at bedside. Reports abdominal pain with eating yesterday. Pain described as moderate, located in a band-like distribution across the mid abdomen without any associated nausea or vomiting. Pain subsided after a few hours. Similar to previous episodes prior to hospitalization. Has not had a bowel movement since arrival. Patient denying chest pain, SOB, palpitations, cough. Patient denies fever, chills, HA, Dizziness, N/V.       Remaining ROS negative       PHYSICAL EXAM:    General:NAD.   HEENT: NC/AT; PERRL, anicteric sclera; MMM  Neck: supple  Cardiovascular: +S1/S2, RRR  Respiratory: CTA B/L; no W/R/R  Gastrointestinal: soft,; +BSx4  Extremities: WWP; no edema, clubbing or cyanosis  Vascular: 2+ radial, DP/PT pulses B/L  Neurological: AAOx3; no focal deficits  Psychiatric: pleasant mood and affect  Dermatologic: no appreciable wounds or damage to the skin    VITAL SIGNS:  Vital Signs Last 24 Hrs  T(C): 36.7 (14 Nov 2024 05:53), Max: 36.9 (13 Nov 2024 20:41)  T(F): 98 (14 Nov 2024 05:53), Max: 98.4 (13 Nov 2024 20:41)  HR: 74 (14 Nov 2024 05:53) (74 - 96)  BP: 117/74 (14 Nov 2024 05:53) (112/79 - 117/74)  BP(mean): 90 (13 Nov 2024 20:41) (90 - 90)  RR: 16 (14 Nov 2024 05:53) (16 - 16)  SpO2: 96% (14 Nov 2024 05:53) (93% - 96%)    Parameters below as of 13 Nov 2024 20:41  Patient On (Oxygen Delivery Method): room air        MEDICATIONS:  MEDICATIONS  (STANDING):  enoxaparin Injectable 90 milliGRAM(s) SubCutaneous every 12 hours  influenza   Vaccine 0.5 milliLiter(s) IntraMuscular once  sodium chloride 0.9%. 1000 milliLiter(s) (80 mL/Hr) IV Continuous <Continuous>    MEDICATIONS  (PRN):  ondansetron Injectable 4 milliGRAM(s) IV Push every 6 hours PRN Nausea and/or Vomiting      ALLERGIES:  Allergies    No Known Allergies    Intolerances        LABS:                        11.1   8.70  )-----------( 192      ( 13 Nov 2024 05:30 )             34.4     11-13    137  |  105  |  14  ----------------------------<  98  4.0   |  23  |  1.00    Ca    7.9[L]      13 Nov 2024 05:30  Phos  3.2     11-13  Mg     1.8     11-13    TPro  6.9  /  Alb  3.1[L]  /  TBili  0.9  /  DBili  x   /  AST  58[H]  /  ALT  16  /  AlkPhos  78  11-13    PT/INR - ( 13 Nov 2024 05:30 )   PT: 13.5 sec;   INR: 1.18          PTT - ( 13 Nov 2024 16:55 )  PTT:115.2 sec  Urinalysis Basic - ( 13 Nov 2024 05:30 )    Color: x / Appearance: x / SG: x / pH: x  Gluc: 98 mg/dL / Ketone: x  / Bili: x / Urobili: x   Blood: x / Protein: x / Nitrite: x   Leuk Esterase: x / RBC: x / WBC x   Sq Epi: x / Non Sq Epi: x / Bacteria: x      CAPILLARY BLOOD GLUCOSE          RADIOLOGY & ADDITIONAL TESTS: Reviewed. ***Note in progress***    OVERNIGHT EVENTS: NAEO    SUBJECTIVE / INTERVAL HPI: Patient seen and examined at bedside. Reports abdominal pain with eating yesterday. Pain described as moderate, located in a band-like distribution across the mid abdomen without any associated nausea or vomiting. Pain subsided after a few hours. Similar to previous episodes prior to hospitalization. Has not had a bowel movement since arrival. Patient denying chest pain, SOB, palpitations, cough. Patient denies fever, chills, HA, Dizziness, N/V.       Remaining ROS negative       PHYSICAL EXAM:    General :NAD.   HEENT: NC/AT; PERRL, anicteric sclera; MMM  Neck: supple  Cardiovascular: +S1/S2, RRR  Respiratory: CTA B/L; no W/R/R  Gastrointestinal: soft,; +BSx4, nontender, mild distension, splenomegaly   Extremities: WWP; no edema, clubbing or cyanosis  Vascular: 2+ radial, DP/PT pulses B/L  Neurological: AAOx3; no focal deficits  Psychiatric: pleasant mood and affect    VITAL SIGNS:  Vital Signs Last 24 Hrs  T(C): 36.7 (14 Nov 2024 05:53), Max: 36.9 (13 Nov 2024 20:41)  T(F): 98 (14 Nov 2024 05:53), Max: 98.4 (13 Nov 2024 20:41)  HR: 74 (14 Nov 2024 05:53) (74 - 96)  BP: 117/74 (14 Nov 2024 05:53) (112/79 - 117/74)  BP(mean): 90 (13 Nov 2024 20:41) (90 - 90)  RR: 16 (14 Nov 2024 05:53) (16 - 16)  SpO2: 96% (14 Nov 2024 05:53) (93% - 96%)    Parameters below as of 13 Nov 2024 20:41  Patient On (Oxygen Delivery Method): room air        MEDICATIONS:  MEDICATIONS  (STANDING):  enoxaparin Injectable 90 milliGRAM(s) SubCutaneous every 12 hours  influenza   Vaccine 0.5 milliLiter(s) IntraMuscular once  sodium chloride 0.9%. 1000 milliLiter(s) (80 mL/Hr) IV Continuous <Continuous>    MEDICATIONS  (PRN):  ondansetron Injectable 4 milliGRAM(s) IV Push every 6 hours PRN Nausea and/or Vomiting      ALLERGIES:  Allergies    No Known Allergies    Intolerances        LABS:                        11.1   8.70  )-----------( 192      ( 13 Nov 2024 05:30 )             34.4     11-13    137  |  105  |  14  ----------------------------<  98  4.0   |  23  |  1.00    Ca    7.9[L]      13 Nov 2024 05:30  Phos  3.2     11-13  Mg     1.8     11-13    TPro  6.9  /  Alb  3.1[L]  /  TBili  0.9  /  DBili  x   /  AST  58[H]  /  ALT  16  /  AlkPhos  78  11-13    PT/INR - ( 13 Nov 2024 05:30 )   PT: 13.5 sec;   INR: 1.18          PTT - ( 13 Nov 2024 16:55 )  PTT:115.2 sec  Urinalysis Basic - ( 13 Nov 2024 05:30 )    Color: x / Appearance: x / SG: x / pH: x  Gluc: 98 mg/dL / Ketone: x  / Bili: x / Urobili: x   Blood: x / Protein: x / Nitrite: x   Leuk Esterase: x / RBC: x / WBC x   Sq Epi: x / Non Sq Epi: x / Bacteria: x      CAPILLARY BLOOD GLUCOSE          RADIOLOGY & ADDITIONAL TESTS: Reviewed. OVERNIGHT EVENTS: NAEO    SUBJECTIVE / INTERVAL HPI: Patient seen and examined at bedside. Reports abdominal pain with eating yesterday. Pain described as moderate, located in a band-like distribution across the mid abdomen without any associated nausea or vomiting. Pain subsided after a few hours. Similar to previous episodes prior to hospitalization. Has not had a bowel movement since arrival. Patient denying chest pain, SOB, palpitations, cough. Patient denies fever, chills, HA, Dizziness, N/V.       Remaining ROS negative       PHYSICAL EXAM:    General :NAD.   HEENT: NC/AT; PERRL, anicteric sclera; MMM  Neck: supple  Cardiovascular: +S1/S2, RRR  Respiratory: CTA B/L; no W/R/R  Gastrointestinal: soft,; +BSx4, nontender, mild distension, splenomegaly   Extremities: WWP; no edema, clubbing or cyanosis  Vascular: 2+ radial, DP/PT pulses B/L  Neurological: AAOx3; no focal deficits  Psychiatric: pleasant mood and affect    VITAL SIGNS:  Vital Signs Last 24 Hrs  T(C): 36.7 (14 Nov 2024 05:53), Max: 36.9 (13 Nov 2024 20:41)  T(F): 98 (14 Nov 2024 05:53), Max: 98.4 (13 Nov 2024 20:41)  HR: 74 (14 Nov 2024 05:53) (74 - 96)  BP: 117/74 (14 Nov 2024 05:53) (112/79 - 117/74)  BP(mean): 90 (13 Nov 2024 20:41) (90 - 90)  RR: 16 (14 Nov 2024 05:53) (16 - 16)  SpO2: 96% (14 Nov 2024 05:53) (93% - 96%)    Parameters below as of 13 Nov 2024 20:41  Patient On (Oxygen Delivery Method): room air        MEDICATIONS:  MEDICATIONS  (STANDING):  enoxaparin Injectable 90 milliGRAM(s) SubCutaneous every 12 hours  influenza   Vaccine 0.5 milliLiter(s) IntraMuscular once  sodium chloride 0.9%. 1000 milliLiter(s) (80 mL/Hr) IV Continuous <Continuous>    MEDICATIONS  (PRN):  ondansetron Injectable 4 milliGRAM(s) IV Push every 6 hours PRN Nausea and/or Vomiting      ALLERGIES:  Allergies    No Known Allergies    Intolerances        LABS:                        11.1   8.70  )-----------( 192      ( 13 Nov 2024 05:30 )             34.4     11-13    137  |  105  |  14  ----------------------------<  98  4.0   |  23  |  1.00    Ca    7.9[L]      13 Nov 2024 05:30  Phos  3.2     11-13  Mg     1.8     11-13    TPro  6.9  /  Alb  3.1[L]  /  TBili  0.9  /  DBili  x   /  AST  58[H]  /  ALT  16  /  AlkPhos  78  11-13    PT/INR - ( 13 Nov 2024 05:30 )   PT: 13.5 sec;   INR: 1.18          PTT - ( 13 Nov 2024 16:55 )  PTT:115.2 sec  Urinalysis Basic - ( 13 Nov 2024 05:30 )    Color: x / Appearance: x / SG: x / pH: x  Gluc: 98 mg/dL / Ketone: x  / Bili: x / Urobili: x   Blood: x / Protein: x / Nitrite: x   Leuk Esterase: x / RBC: x / WBC x   Sq Epi: x / Non Sq Epi: x / Bacteria: x      CAPILLARY BLOOD GLUCOSE          RADIOLOGY & ADDITIONAL TESTS: Reviewed.

## 2024-11-14 NOTE — DISCHARGE NOTE PROVIDER - CARE PROVIDER_API CALL
Rahul Jhaveri  Internal Medicine  [address]  [place] [address]  Phone: [phone]  Fax: [phone]  Established Patient  Follow Up Time: 2 weeks   Thomas Beaver  Internal Medicine  [address], [place] [address]  Phone: [phone]  Fax: [phone]  Follow Up Time:     Azeem Bird  Medical Oncology  [address]  [place] [address]  Phone: [phone]  Fax: [phone]  Scheduled Appointment: 11/26/2024 02:00 PM

## 2024-11-14 NOTE — DISCHARGE NOTE PROVIDER - CARE PROVIDERS DIRECT ADDRESSES
,[email or web address] ,[email or web address]

## 2024-11-15 ENCOUNTER — TRANSCRIPTION ENCOUNTER (OUTPATIENT)
Age: 59
End: 2024-11-15

## 2024-11-15 LAB — BCR/ABL BY RT - PCR QUANTITATIVE: SIGNIFICANT CHANGE UP

## 2024-11-18 ENCOUNTER — APPOINTMENT (OUTPATIENT)
Dept: INTERNAL MEDICINE | Facility: CLINIC | Age: 59
End: 2024-11-18
Payer: COMMERCIAL

## 2024-11-18 LAB
DNA PLOIDY SPEC FC-IMP: SIGNIFICANT CHANGE UP
DRVVT RATIO: 1.02 RATIO — SIGNIFICANT CHANGE UP (ref 0–1.03)
DRVVT SCREEN TO CONFIRM RATIO: SIGNIFICANT CHANGE UP
PTR INTERPRETATION: SIGNIFICANT CHANGE UP

## 2024-11-18 PROCEDURE — 99214 OFFICE O/P EST MOD 30 MIN: CPT

## 2024-11-18 PROCEDURE — G2211 COMPLEX E/M VISIT ADD ON: CPT | Mod: NC

## 2024-11-21 DIAGNOSIS — N17.9 ACUTE KIDNEY FAILURE, UNSPECIFIED: ICD-10-CM

## 2024-11-21 DIAGNOSIS — K55.069 ACUTE INFARCTION OF INTESTINE, PART AND EXTENT UNSPECIFIED: ICD-10-CM

## 2024-11-21 DIAGNOSIS — I81 PORTAL VEIN THROMBOSIS: ICD-10-CM

## 2024-11-21 DIAGNOSIS — R16.1 SPLENOMEGALY, NOT ELSEWHERE CLASSIFIED: ICD-10-CM

## 2024-11-21 DIAGNOSIS — D72.829 ELEVATED WHITE BLOOD CELL COUNT, UNSPECIFIED: ICD-10-CM

## 2024-11-21 DIAGNOSIS — F10.90 ALCOHOL USE, UNSPECIFIED, UNCOMPLICATED: ICD-10-CM

## 2024-11-21 DIAGNOSIS — I10 ESSENTIAL (PRIMARY) HYPERTENSION: ICD-10-CM

## 2024-11-21 DIAGNOSIS — R79.9 ABNORMAL FINDING OF BLOOD CHEMISTRY, UNSPECIFIED: ICD-10-CM

## 2024-11-21 DIAGNOSIS — D68.9 COAGULATION DEFECT, UNSPECIFIED: ICD-10-CM

## 2024-11-22 ENCOUNTER — APPOINTMENT (OUTPATIENT)
Dept: INTERNAL MEDICINE | Facility: CLINIC | Age: 59
End: 2024-11-22
Payer: COMMERCIAL

## 2024-11-22 VITALS
WEIGHT: 196 LBS | DIASTOLIC BLOOD PRESSURE: 87 MMHG | BODY MASS INDEX: 27.44 KG/M2 | SYSTOLIC BLOOD PRESSURE: 147 MMHG | OXYGEN SATURATION: 97 % | HEIGHT: 71 IN | HEART RATE: 94 BPM | TEMPERATURE: 98.2 F

## 2024-11-22 DIAGNOSIS — I10 ESSENTIAL (PRIMARY) HYPERTENSION: ICD-10-CM

## 2024-11-22 PROBLEM — I81 PORTAL VEIN THROMBOSIS: Status: ACTIVE | Noted: 2024-11-18

## 2024-11-22 PROBLEM — Z09 HOSPITAL DISCHARGE FOLLOW-UP: Status: ACTIVE | Noted: 2024-11-22

## 2024-11-22 PROCEDURE — G2211 COMPLEX E/M VISIT ADD ON: CPT | Mod: NC

## 2024-11-22 PROCEDURE — 99214 OFFICE O/P EST MOD 30 MIN: CPT

## 2024-11-25 LAB
ANA SER QL IA: NEGATIVE
B2 GLYCOPROT1 IGA SERPL IA-ACNC: 9.7 U/ML
B2 GLYCOPROT1 IGG SER-ACNC: 8.3 U/ML
B2 GLYCOPROT1 IGM SER-ACNC: 33.7 U/ML
CANCER AG19-9 SERPL-ACNC: 130 U/ML
CCP AB SER IA-ACNC: <8 U/ML
CEA SERPL-MCNC: 1.9 NG/ML
CENTROMERE IGG SER-ACNC: <0.2 AL
CHROMATIN AB SERPL-ACNC: <0.2 AL
CRP SERPL-MCNC: <3 MG/L
DSDNA AB SER-ACNC: <1 IU/ML
ENA JO1 AB SER IA-ACNC: <0.2 AL
ENA RNP AB SER IA-ACNC: 0.3 AL
ENA SCL70 IGG SER IA-ACNC: <0.2 AL
ENA SM AB SER IA-ACNC: <0.2 AL
ENA SS-A AB SER IA-ACNC: <0.2 AL
ENA SS-B AB SER IA-ACNC: <0.2 AL
ERYTHROCYTE [SEDIMENTATION RATE] IN BLOOD BY WESTERGREN METHOD: 52 MM/HR
RF+CCP IGG SER-IMP: NEGATIVE
RIBOSOMAL P AB SER IA-ACNC: <0.2 AL

## 2024-11-26 ENCOUNTER — TRANSCRIPTION ENCOUNTER (OUTPATIENT)
Age: 59
End: 2024-11-26

## 2024-11-26 ENCOUNTER — APPOINTMENT (OUTPATIENT)
Dept: HEMATOLOGY ONCOLOGY | Facility: CLINIC | Age: 59
End: 2024-11-26
Payer: SELF-PAY

## 2024-11-26 VITALS
TEMPERATURE: 98.2 F | HEART RATE: 85 BPM | SYSTOLIC BLOOD PRESSURE: 149 MMHG | BODY MASS INDEX: 27.16 KG/M2 | HEIGHT: 71 IN | OXYGEN SATURATION: 99 % | DIASTOLIC BLOOD PRESSURE: 98 MMHG | RESPIRATION RATE: 18 BRPM | WEIGHT: 194 LBS

## 2024-11-26 DIAGNOSIS — I81 PORTAL VEIN THROMBOSIS: ICD-10-CM

## 2024-11-26 DIAGNOSIS — Z09 ENCOUNTER FOR FOLLOW-UP EXAMINATION AFTER COMPLETED TREATMENT FOR CONDITIONS OTHER THAN MALIGNANT NEOPLASM: ICD-10-CM

## 2024-11-26 PROCEDURE — 99204 OFFICE O/P NEW MOD 45 MIN: CPT

## 2024-11-26 PROCEDURE — G2211 COMPLEX E/M VISIT ADD ON: CPT

## 2024-12-13 DIAGNOSIS — I80.8 PHLEBITIS AND THROMBOPHLEBITIS OF OTHER SITES: ICD-10-CM

## 2024-12-13 RX ORDER — APIXABAN 5 MG/1
5 TABLET, FILM COATED ORAL
Qty: 180 | Refills: 0 | Status: ACTIVE | COMMUNITY
Start: 2024-12-13 | End: 1900-01-01

## 2024-12-24 ENCOUNTER — APPOINTMENT (OUTPATIENT)
Dept: GASTROENTEROLOGY | Facility: CLINIC | Age: 59
End: 2024-12-24

## 2025-01-21 ENCOUNTER — APPOINTMENT (OUTPATIENT)
Dept: INTERNAL MEDICINE | Facility: CLINIC | Age: 60
End: 2025-01-21

## 2025-02-25 ENCOUNTER — APPOINTMENT (OUTPATIENT)
Dept: HEMATOLOGY ONCOLOGY | Facility: CLINIC | Age: 60
End: 2025-02-25

## 2025-03-17 ENCOUNTER — APPOINTMENT (OUTPATIENT)
Dept: HEMATOLOGY ONCOLOGY | Facility: CLINIC | Age: 60
End: 2025-03-17
Payer: SELF-PAY

## 2025-03-17 VITALS
SYSTOLIC BLOOD PRESSURE: 142 MMHG | OXYGEN SATURATION: 98 % | TEMPERATURE: 97.8 F | BODY MASS INDEX: 29.26 KG/M2 | HEIGHT: 71 IN | RESPIRATION RATE: 18 BRPM | DIASTOLIC BLOOD PRESSURE: 88 MMHG | WEIGHT: 209 LBS | HEART RATE: 89 BPM

## 2025-03-17 DIAGNOSIS — I81 PORTAL VEIN THROMBOSIS: ICD-10-CM

## 2025-03-17 PROCEDURE — 36415 COLL VENOUS BLD VENIPUNCTURE: CPT

## 2025-03-17 PROCEDURE — 99214 OFFICE O/P EST MOD 30 MIN: CPT | Mod: 25

## 2025-03-20 LAB
B2 GLYCOPROT1 IGA SERPL IA-ACNC: 5.9 U/ML
CARDIOLIPIN IGM SER-MCNC: 2 GPL U/ML
CARDIOLIPIN IGM SER-MCNC: 9.3 MPL U/ML
DEPRECATED CARDIOLIPIN IGA SER: 7.7 APL U/ML

## 2025-03-21 LAB
B2 GLYCOPROT1 IGG SER-ACNC: 1.9 U/ML
B2 GLYCOPROT1 IGM SER-ACNC: 11.5 U/ML

## 2025-04-01 ENCOUNTER — APPOINTMENT (OUTPATIENT)
Dept: HEMATOLOGY ONCOLOGY | Facility: CLINIC | Age: 60
End: 2025-04-01

## 2025-04-03 ENCOUNTER — APPOINTMENT (OUTPATIENT)
Dept: HEMATOLOGY ONCOLOGY | Facility: CLINIC | Age: 60
End: 2025-04-03

## 2025-05-20 ENCOUNTER — NON-APPOINTMENT (OUTPATIENT)
Age: 60
End: 2025-05-20